# Patient Record
Sex: FEMALE | Race: WHITE | NOT HISPANIC OR LATINO | Employment: FULL TIME | URBAN - METROPOLITAN AREA
[De-identification: names, ages, dates, MRNs, and addresses within clinical notes are randomized per-mention and may not be internally consistent; named-entity substitution may affect disease eponyms.]

---

## 2019-08-08 ENCOUNTER — HOSPITAL ENCOUNTER (OUTPATIENT)
Dept: ULTRASOUND IMAGING | Facility: HOSPITAL | Age: 24
Discharge: HOME | End: 2019-08-08
Attending: NURSE PRACTITIONER

## 2019-09-13 ENCOUNTER — HOSPITAL ENCOUNTER (OUTPATIENT)
Dept: NUCLEAR MEDICINE | Facility: HOSPITAL | Age: 24
Discharge: HOME | End: 2019-09-13
Attending: NURSE PRACTITIONER

## 2019-11-26 ENCOUNTER — HOSPITAL ENCOUNTER (OUTPATIENT)
Dept: GENERAL RADIOLOGY | Facility: HOSPITAL | Age: 24
Discharge: HOME | End: 2019-11-26
Attending: NURSE PRACTITIONER

## 2020-03-23 ENCOUNTER — OFFICE VISIT CONVERTED (OUTPATIENT)
Dept: GASTROENTEROLOGY | Facility: CLINIC | Age: 25
End: 2020-03-23
Attending: INTERNAL MEDICINE

## 2020-03-23 ENCOUNTER — HOSPITAL ENCOUNTER (OUTPATIENT)
Dept: LAB | Facility: HOSPITAL | Age: 25
Discharge: HOME | End: 2020-03-23
Attending: INTERNAL MEDICINE

## 2020-03-23 LAB
ALBUMIN SERPL-MCNC: 5 G/DL (ref 3.5–5)
ALBUMIN/GLOB SERPL: 2.3 {RATIO} (ref 1.4–2.6)
ALP SERPL-CCNC: 63 U/L (ref 42–98)
ALT SERPL-CCNC: 20 U/L (ref 10–40)
ANION GAP SERPL CALC-SCNC: 18 MMOL/L (ref 8–19)
AST SERPL-CCNC: 20 U/L (ref 15–50)
BASOPHILS # BLD AUTO: 0.05 10*3/UL (ref 0–0.2)
BASOPHILS NFR BLD AUTO: 0.9 % (ref 0–3)
BILIRUB SERPL-MCNC: 1.45 MG/DL (ref 0.2–1.3)
BUN SERPL-MCNC: 8 MG/DL (ref 5–25)
BUN/CREAT SERPL: 12 {RATIO} (ref 6–20)
CALCIUM SERPL-MCNC: 9.6 MG/DL (ref 8.7–10.4)
CHLORIDE SERPL-SCNC: 101 MMOL/L (ref 99–111)
CONV ABS IMM GRAN: 0.01 10*3/UL (ref 0–0.2)
CONV CO2: 24 MMOL/L (ref 22–32)
CONV IMMATURE GRAN: 0.2 % (ref 0–1.8)
CONV TOTAL PROTEIN: 7.2 G/DL (ref 6.3–8.2)
CREAT UR-MCNC: 0.69 MG/DL (ref 0.5–0.9)
DEPRECATED RDW RBC AUTO: 39.3 FL (ref 36.4–46.3)
EOSINOPHIL # BLD AUTO: 0.05 10*3/UL (ref 0–0.7)
EOSINOPHIL # BLD AUTO: 0.9 % (ref 0–7)
ERYTHROCYTE [DISTWIDTH] IN BLOOD BY AUTOMATED COUNT: 12 % (ref 11.7–14.4)
GFR SERPLBLD BASED ON 1.73 SQ M-ARVRAT: >60 ML/MIN/{1.73_M2}
GLOBULIN UR ELPH-MCNC: 2.2 G/DL (ref 2–3.5)
GLUCOSE SERPL-MCNC: 82 MG/DL (ref 65–99)
HCT VFR BLD AUTO: 45.3 % (ref 37–47)
HGB BLD-MCNC: 14.4 G/DL (ref 12–16)
LYMPHOCYTES # BLD AUTO: 1.86 10*3/UL (ref 1–5)
LYMPHOCYTES NFR BLD AUTO: 32 % (ref 20–45)
MCH RBC QN AUTO: 28.6 PG (ref 27–31)
MCHC RBC AUTO-ENTMCNC: 31.8 G/DL (ref 33–37)
MCV RBC AUTO: 89.9 FL (ref 81–99)
MONOCYTES # BLD AUTO: 0.34 10*3/UL (ref 0.2–1.2)
MONOCYTES NFR BLD AUTO: 5.9 % (ref 3–10)
NEUTROPHILS # BLD AUTO: 3.5 10*3/UL (ref 2–8)
NEUTROPHILS NFR BLD AUTO: 60.1 % (ref 30–85)
NRBC CBCN: 0 % (ref 0–0.7)
OSMOLALITY SERPL CALC.SUM OF ELEC: 285 MOSM/KG (ref 273–304)
PLATELET # BLD AUTO: 226 10*3/UL (ref 130–400)
PMV BLD AUTO: 11.3 FL (ref 9.4–12.3)
POTASSIUM SERPL-SCNC: 3.8 MMOL/L (ref 3.5–5.3)
RBC # BLD AUTO: 5.04 10*6/UL (ref 4.2–5.4)
SODIUM SERPL-SCNC: 139 MMOL/L (ref 135–147)
T4 FREE SERPL-MCNC: 1.3 NG/DL (ref 0.9–1.8)
TSH SERPL-ACNC: 2.3 M[IU]/L (ref 0.27–4.2)
WBC # BLD AUTO: 5.81 10*3/UL (ref 4.8–10.8)

## 2020-03-24 LAB
ALBUMIN SERPL-MCNC: 4.1 G/DL (ref 2.9–4.4)
ALBUMIN/GLOB SERPL: 1.7 {RATIO} (ref 0.7–1.7)
ALPHA2 GLOB SERPL ELPH-MCNC: 0.7 G/DL (ref 0.4–1)
BETA GLOBULIN: 0.8 G/DL (ref 0.7–1.3)
CONV ALPHA-1-GLOBULIN: 0.2 G/DL (ref 0–0.4)
CONV IMMUNOGLOBULIN G (IGG): 671 MG/DL (ref 700–1600)
CONV IMMUNOGLOBULIN M (IGM): 66 MG/DL (ref 26–217)
CONV PE INTERPRETATION: ABNORMAL
CONV PE NOTE: ABNORMAL
CONV TOTAL PROTEIN: 6.5 G/DL (ref 6–8.5)
DEPRECATED MITOCHONDRIA M2 IGG SER-ACNC: <20 UNITS (ref 0–20)
DSDNA AB SER-ACNC: NEGATIVE [IU]/ML
ENA AB SER IA-ACNC: NEGATIVE {RATIO}
GAMMA GLOB SERPL ELPH-MCNC: 0.7 G/DL (ref 0.4–1.8)
GLOBULIN UR ELPH-MCNC: 2.4 G/DL (ref 2.2–3.9)
IGA SERPL-MCNC: 51 MG/DL (ref 87–352)
M-SPIKE: ABNORMAL G/DL
PROT PATTERN SERPL IFE-IMP: ABNORMAL
SMOOTH MUSCLE F-ACTIN AB IGG: 7 UNITS (ref 0–19)

## 2020-03-26 LAB — CONV CELIAC DISEASE AB-IGA: 42 MG/DL (ref 68–408)

## 2020-03-30 LAB — CELIAC DISEASE DUAL AG SCR: 6 UNITS (ref 0–19)

## 2020-06-19 ENCOUNTER — HOSPITAL ENCOUNTER (OUTPATIENT)
Dept: OTHER | Facility: HOSPITAL | Age: 25
Discharge: HOME | End: 2020-06-19
Attending: OBSTETRICS & GYNECOLOGY

## 2020-06-19 LAB
C TRACH RRNA CVX QL NAA+PROBE: NOT DETECTED
N GONORRHOEA DNA SPEC QL NAA+PROBE: NOT DETECTED

## 2020-06-21 LAB — BACTERIA UR CULT: NORMAL

## 2020-06-23 ENCOUNTER — HOSPITAL ENCOUNTER (OUTPATIENT)
Dept: OTHER | Facility: HOSPITAL | Age: 25
Discharge: HOME | End: 2020-06-23
Attending: OBSTETRICS & GYNECOLOGY

## 2020-06-23 LAB
ABO GROUP BLD: NORMAL
BLD GP AB SCN SERPL QL: NORMAL
CONV ABD CONTROL: NORMAL
RH BLD: NORMAL

## 2020-06-24 LAB
ABO GROUP BLD: ABNORMAL
BASOPHILS # BLD AUTO: 0.04 10*3/UL (ref 0–0.2)
BASOPHILS NFR BLD AUTO: 0.5 % (ref 0–3)
BLD GP AB SCN SERPL QL: ABNORMAL
CONV ABS IMM GRAN: 0.03 10*3/UL (ref 0–0.2)
CONV HEPATITIS B SURFACE AG W CONFIRMATION RE: NEGATIVE
CONV HGB ELECTROPHORESIS INTERPRETATION: NORMAL
CONV HIV-1/ HIV-2: NONREACTIVE
CONV IMMATURE GRAN: 0.4 % (ref 0–1.8)
DEPRECATED RDW RBC AUTO: 39.5 FL (ref 36.4–46.3)
EOSINOPHIL # BLD AUTO: 0.06 10*3/UL (ref 0–0.7)
EOSINOPHIL # BLD AUTO: 0.7 % (ref 0–7)
ERYTHROCYTE [DISTWIDTH] IN BLOOD BY AUTOMATED COUNT: 12.1 % (ref 11.7–14.4)
HCT VFR BLD AUTO: 43.5 % (ref 37–47)
HCV AB SER DONR QL: <0.1 S/CO RATIO (ref 0–0.9)
HEMOGLOBIN A: 97.8 % (ref 96.4–98.8)
HGB A2 MFR BLD COLUMN CHROM: 2.2 % (ref 1.8–3.2)
HGB BLD-MCNC: 14.3 G/DL (ref 12–16)
HGB C MFR BLD: 0 %
HGB F MFR BLD HPLC: 0 % (ref 0–2)
HGB S BLD QL SOLY: NEGATIVE
HGB S MFR BLD: 0 %
LYMPHOCYTES # BLD AUTO: 2.16 10*3/UL (ref 1–5)
LYMPHOCYTES NFR BLD AUTO: 26.2 % (ref 20–45)
MCH RBC QN AUTO: 29.1 PG (ref 27–31)
MCHC RBC AUTO-ENTMCNC: 32.9 G/DL (ref 33–37)
MCV RBC AUTO: 88.6 FL (ref 81–99)
MONOCYTES # BLD AUTO: 0.44 10*3/UL (ref 0.2–1.2)
MONOCYTES NFR BLD AUTO: 5.3 % (ref 3–10)
NEUTROPHILS # BLD AUTO: 5.52 10*3/UL (ref 2–8)
NEUTROPHILS NFR BLD AUTO: 66.9 % (ref 30–85)
NRBC CBCN: 0 % (ref 0–0.7)
PLATELET # BLD AUTO: 224 10*3/UL (ref 130–400)
PMV BLD AUTO: 10.6 FL (ref 9.4–12.3)
RBC # BLD AUTO: 4.91 10*6/UL (ref 4.2–5.4)
RH BLD: ABNORMAL
RPR SER QL: ABNORMAL
RUBV IGG SER-ACNC: 324 [IU]/ML
WBC # BLD AUTO: 8.25 10*3/UL (ref 4.8–10.8)

## 2020-06-25 LAB
CONV LAST MENSTURAL PERIOD: NORMAL
SPECIMEN SOURCE: NORMAL
SPECIMEN SOURCE: NORMAL
THIN PREP CVX: NORMAL

## 2020-07-13 ENCOUNTER — HOSPITAL ENCOUNTER (OUTPATIENT)
Dept: GENERAL RADIOLOGY | Facility: HOSPITAL | Age: 25
Discharge: HOME | End: 2020-07-13
Attending: OBSTETRICS & GYNECOLOGY

## 2020-09-08 ENCOUNTER — HOSPITAL ENCOUNTER (OUTPATIENT)
Dept: GENERAL RADIOLOGY | Facility: HOSPITAL | Age: 25
Discharge: HOME | End: 2020-09-08
Attending: OBSTETRICS & GYNECOLOGY

## 2020-10-30 ENCOUNTER — HOSPITAL ENCOUNTER (OUTPATIENT)
Dept: OTHER | Facility: HOSPITAL | Age: 25
Discharge: HOME | End: 2020-10-30
Attending: OBSTETRICS & GYNECOLOGY

## 2020-10-30 LAB
BASOPHILS # BLD AUTO: 0.03 10*3/UL (ref 0–0.2)
BASOPHILS NFR BLD AUTO: 0.3 % (ref 0–3)
CONV ABS IMM GRAN: 0.04 10*3/UL (ref 0–0.2)
CONV GLUCOSE LOAD: 50 G
CONV IMMATURE GRAN: 0.4 % (ref 0–1.8)
DEPRECATED RDW RBC AUTO: 42.5 FL (ref 36.4–46.3)
EOSINOPHIL # BLD AUTO: 0.12 10*3/UL (ref 0–0.7)
EOSINOPHIL # BLD AUTO: 1.3 % (ref 0–7)
ERYTHROCYTE [DISTWIDTH] IN BLOOD BY AUTOMATED COUNT: 12.7 % (ref 11.7–14.4)
GLUCOSE 1H P MEAL SERPL-MCNC: 182 MG/DL (ref 0–182)
GLUCOSE BLD-MCNC: 78 MG/DL (ref 65–99)
HCT VFR BLD AUTO: 40.1 % (ref 37–47)
HGB BLD-MCNC: 13.4 G/DL (ref 12–16)
LYMPHOCYTES # BLD AUTO: 1.97 10*3/UL (ref 1–5)
LYMPHOCYTES NFR BLD AUTO: 21.1 % (ref 20–45)
MCH RBC QN AUTO: 30.6 PG (ref 27–31)
MCHC RBC AUTO-ENTMCNC: 33.4 G/DL (ref 33–37)
MCV RBC AUTO: 91.6 FL (ref 81–99)
MONOCYTES # BLD AUTO: 0.45 10*3/UL (ref 0.2–1.2)
MONOCYTES NFR BLD AUTO: 4.8 % (ref 3–10)
NEUTROPHILS # BLD AUTO: 6.72 10*3/UL (ref 2–8)
NEUTROPHILS NFR BLD AUTO: 72.1 % (ref 30–85)
NRBC CBCN: 0 % (ref 0–0.7)
PLATELET # BLD AUTO: 189 10*3/UL (ref 130–400)
PMV BLD AUTO: 10.7 FL (ref 9.4–12.3)
RBC # BLD AUTO: 4.38 10*6/UL (ref 4.2–5.4)
WBC # BLD AUTO: 9.33 10*3/UL (ref 4.8–10.8)

## 2020-11-10 ENCOUNTER — HOSPITAL ENCOUNTER (OUTPATIENT)
Dept: OTHER | Facility: HOSPITAL | Age: 25
Discharge: HOME | End: 2020-11-10
Attending: OBSTETRICS & GYNECOLOGY

## 2020-11-10 LAB
CONV GLUCOSE LOAD: 100 G
GLUCOSE 1H P LAC PO SERPL-MCNC: 184 MG/DL
GLUCOSE 2H P 75 G GLC PO SERPL-MCNC: 154 MG/DL (ref 70–140)
GLUCOSE 3H P GLC SERPL-MCNC: 132 MG/DL
GLUCOSE BLD-MCNC: 68 MG/DL (ref 65–99)
GLUCOSE P FAST SERPL-MCNC: 73 MG/DL (ref 65–115)

## 2021-01-14 ENCOUNTER — HOSPITAL ENCOUNTER (OUTPATIENT)
Dept: OTHER | Facility: HOSPITAL | Age: 26
Discharge: HOME | End: 2021-01-14
Attending: OBSTETRICS & GYNECOLOGY

## 2021-01-16 LAB — STREP GP B CULTURE: NORMAL

## 2021-01-25 ENCOUNTER — HOSPITAL ENCOUNTER (OUTPATIENT)
Dept: PREADMISSION TESTING | Facility: HOSPITAL | Age: 26
Discharge: HOME | End: 2021-01-25
Attending: OBSTETRICS & GYNECOLOGY

## 2021-01-26 LAB — SARS-COV-2 RNA SPEC QL NAA+PROBE: NOT DETECTED

## 2021-05-15 VITALS — BODY MASS INDEX: 24.7 KG/M2 | HEIGHT: 62 IN | TEMPERATURE: 98.1 F | WEIGHT: 134.25 LBS | HEART RATE: 63 BPM

## 2023-02-02 ENCOUNTER — E-VISIT (OUTPATIENT)
Dept: FAMILY MEDICINE CLINIC | Facility: TELEHEALTH | Age: 28
End: 2023-02-02

## 2023-03-14 ENCOUNTER — TELEPHONE (OUTPATIENT)
Dept: OBSTETRICS AND GYNECOLOGY | Facility: CLINIC | Age: 28
End: 2023-03-14
Payer: COMMERCIAL

## 2023-03-14 DIAGNOSIS — R11.2 NAUSEA AND VOMITING, UNSPECIFIED VOMITING TYPE: Primary | ICD-10-CM

## 2023-03-14 RX ORDER — ONDANSETRON 4 MG/1
4 TABLET, FILM COATED ORAL EVERY 6 HOURS PRN
Qty: 30 TABLET | Refills: 2 | Status: SHIPPED | OUTPATIENT
Start: 2023-03-14

## 2023-03-14 NOTE — TELEPHONE ENCOUNTER
Patient called requesting something for nausea.  Patient states Pyridoxine & Doxylamine not helping.  Per protocol okay'd refill Zofran.  Next appointment 4/17/23

## 2023-04-17 ENCOUNTER — INITIAL PRENATAL (OUTPATIENT)
Dept: OBSTETRICS AND GYNECOLOGY | Facility: CLINIC | Age: 28
End: 2023-04-17
Payer: COMMERCIAL

## 2023-04-17 VITALS — WEIGHT: 158 LBS | DIASTOLIC BLOOD PRESSURE: 80 MMHG | BODY MASS INDEX: 28.9 KG/M2 | SYSTOLIC BLOOD PRESSURE: 124 MMHG

## 2023-04-17 DIAGNOSIS — Z34.80 SUPERVISION OF OTHER NORMAL PREGNANCY, ANTEPARTUM: Primary | ICD-10-CM

## 2023-04-17 LAB
ABO GROUP BLD: NORMAL
B-HCG UR QL: POSITIVE
BASOPHILS # BLD AUTO: 0.03 10*3/MM3 (ref 0–0.2)
BASOPHILS NFR BLD AUTO: 0.4 % (ref 0–1.5)
BLD GP AB SCN SERPL QL: NEGATIVE
DEPRECATED RDW RBC AUTO: 42.5 FL (ref 37–54)
EOSINOPHIL # BLD AUTO: 0.03 10*3/MM3 (ref 0–0.4)
EOSINOPHIL NFR BLD AUTO: 0.4 % (ref 0.3–6.2)
ERYTHROCYTE [DISTWIDTH] IN BLOOD BY AUTOMATED COUNT: 13.2 % (ref 12.3–15.4)
EXPIRATION DATE: ABNORMAL
GLUCOSE UR STRIP-MCNC: NEGATIVE MG/DL
HBV SURFACE AG SERPL QL IA: NORMAL
HCT VFR BLD AUTO: 43.5 % (ref 34–46.6)
HCV AB SER DONR QL: NORMAL
HGB BLD-MCNC: 14.4 G/DL (ref 12–15.9)
HIV1+2 AB SER QL: NORMAL
IMM GRANULOCYTES # BLD AUTO: 0.02 10*3/MM3 (ref 0–0.05)
IMM GRANULOCYTES NFR BLD AUTO: 0.3 % (ref 0–0.5)
INTERNAL NEGATIVE CONTROL: ABNORMAL
INTERNAL POSITIVE CONTROL: ABNORMAL
LYMPHOCYTES # BLD AUTO: 1.51 10*3/MM3 (ref 0.7–3.1)
LYMPHOCYTES NFR BLD AUTO: 19.1 % (ref 19.6–45.3)
Lab: ABNORMAL
MCH RBC QN AUTO: 29.1 PG (ref 26.6–33)
MCHC RBC AUTO-ENTMCNC: 33.1 G/DL (ref 31.5–35.7)
MCV RBC AUTO: 87.9 FL (ref 79–97)
MONOCYTES # BLD AUTO: 0.35 10*3/MM3 (ref 0.1–0.9)
MONOCYTES NFR BLD AUTO: 4.4 % (ref 5–12)
NEUTROPHILS NFR BLD AUTO: 5.98 10*3/MM3 (ref 1.7–7)
NEUTROPHILS NFR BLD AUTO: 75.4 % (ref 42.7–76)
NRBC BLD AUTO-RTO: 0 /100 WBC (ref 0–0.2)
PLATELET # BLD AUTO: 196 10*3/MM3 (ref 140–450)
PMV BLD AUTO: 10.8 FL (ref 6–12)
PROT UR STRIP-MCNC: NEGATIVE MG/DL
RBC # BLD AUTO: 4.95 10*6/MM3 (ref 3.77–5.28)
RH BLD: POSITIVE
T PALLIDUM IGG SER QL: NORMAL
WBC NRBC COR # BLD: 7.92 10*3/MM3 (ref 3.4–10.8)

## 2023-04-17 PROCEDURE — 86900 BLOOD TYPING SEROLOGIC ABO: CPT | Performed by: OBSTETRICS & GYNECOLOGY

## 2023-04-17 PROCEDURE — 86762 RUBELLA ANTIBODY: CPT | Performed by: OBSTETRICS & GYNECOLOGY

## 2023-04-17 PROCEDURE — 87661 TRICHOMONAS VAGINALIS AMPLIF: CPT | Performed by: OBSTETRICS & GYNECOLOGY

## 2023-04-17 PROCEDURE — 85025 COMPLETE CBC W/AUTO DIFF WBC: CPT | Performed by: OBSTETRICS & GYNECOLOGY

## 2023-04-17 PROCEDURE — 86803 HEPATITIS C AB TEST: CPT | Performed by: OBSTETRICS & GYNECOLOGY

## 2023-04-17 PROCEDURE — G0432 EIA HIV-1/HIV-2 SCREEN: HCPCS | Performed by: OBSTETRICS & GYNECOLOGY

## 2023-04-17 PROCEDURE — 0501F PRENATAL FLOW SHEET: CPT | Performed by: OBSTETRICS & GYNECOLOGY

## 2023-04-17 PROCEDURE — 81025 URINE PREGNANCY TEST: CPT | Performed by: OBSTETRICS & GYNECOLOGY

## 2023-04-17 PROCEDURE — 87491 CHLMYD TRACH DNA AMP PROBE: CPT | Performed by: OBSTETRICS & GYNECOLOGY

## 2023-04-17 PROCEDURE — 86850 RBC ANTIBODY SCREEN: CPT | Performed by: OBSTETRICS & GYNECOLOGY

## 2023-04-17 PROCEDURE — 81002 URINALYSIS NONAUTO W/O SCOPE: CPT | Performed by: OBSTETRICS & GYNECOLOGY

## 2023-04-17 PROCEDURE — 87086 URINE CULTURE/COLONY COUNT: CPT | Performed by: OBSTETRICS & GYNECOLOGY

## 2023-04-17 PROCEDURE — G0123 SCREEN CERV/VAG THIN LAYER: HCPCS | Performed by: OBSTETRICS & GYNECOLOGY

## 2023-04-17 PROCEDURE — 87591 N.GONORRHOEAE DNA AMP PROB: CPT | Performed by: OBSTETRICS & GYNECOLOGY

## 2023-04-17 PROCEDURE — 86780 TREPONEMA PALLIDUM: CPT | Performed by: OBSTETRICS & GYNECOLOGY

## 2023-04-17 PROCEDURE — 87340 HEPATITIS B SURFACE AG IA: CPT | Performed by: OBSTETRICS & GYNECOLOGY

## 2023-04-17 PROCEDURE — 86901 BLOOD TYPING SEROLOGIC RH(D): CPT | Performed by: OBSTETRICS & GYNECOLOGY

## 2023-04-17 NOTE — PROGRESS NOTES
OB Initial Visit    CC- Here for care of current pregnancy     Subjective:  28 y.o.  presenting for her first obstetrical visit.    LMP: Patient's last menstrual period was 2023 (exact date).   Patient has no complaints.  She denies any vaginal bleeding or pelvic pain.  She reports that her nausea is better.    Reviewed and updated:  OBHx, GYNHx (STDs), PMHx, Medications, Allergies, PSHx, Social Hx, Preventative Hx (PAP), Hx of abuse/safe environment, Vaccine Hx including hx of chickenpox or vaccine, Genetic Hx (pt, FOB, both families).        Objective:  /80   Wt 71.7 kg (158 lb)   LMP 2023 (Exact Date)   BMI 28.90 kg/m²   General- NAD, alert and oriented, appropriate  Psych- Normal mood, good memory  Neck- No masses, no thyroid enlargement  CV- Regular rhythm, no murnurs  Resp- CTA to bases, no wheezes  Abdomen- Soft, non distended, non tender, no masses   External genitalia- Normal, no lesions  Urethra- Normal, no masses, non tender  Vagina- Normal, no discharge  Bladder- Normal, no masses, non tender  Cvx- Normal, no lesions, no discharge, no CMT  Uterus- Normal shape and consistency, non tender, Consistent with dates  Adnexa- Normal, no mass, non tender  Lymphatic- No palpable neck, axillary, or groin nodes  Ext- No edema, no cyanosis    Skin- No lesions, no rashes, no acanthosis nigricans      Assessment and Plan:  Diagnoses and all orders for this visit:    1. Supervision of other normal pregnancy, antepartum (Primary)  Overview:  EDC:    Prenatal genetic screening: Declined    Previous  delivery    COVID-19 vaccine: Recommended  Flu vaccine: Recommended  Tdap vaccine: Recommended    Assessment & Plan:  Continue prenatal vitamins  Check prenatal labs  Check dating ultrasound  Discussed office visit schedule and call rotation  Reviewed medication safe in pregnancy  Declines prenatal genetic screening  Reviewed nutrition, exercise, and appropriate weight gain in  pregnancy    Orders:  -     POC Urinalysis Dipstick  -     POC Pregnancy, Urine  -     Urine Culture - Urine, Urine, Clean Catch  -     IGP,CtNgTv,rfx Aptima HPV ASCU  -     OB Panel With HIV  -     US Ob 14 + Weeks Single or First Gestation; Future          14w4d    Genetic Screening: Counseled.  Declines all.     Vaccines: Recommend FLU vaccine this season, R/B discussed  Recommend COVID vaccine, R/B discussed    Counseling: Nutrition discussed, calories, activity/exercise in pregnancy  Discussed dietary restrictions/safety food preparation in pregnancy  Reviewed what to expect prenatal visits, office providers and [facility] hospitalists  Appropriate trimester precautions provided, N/V, vag bleeding, cramping  Questions answered    Return in about 5 weeks (around 5/22/2023) for Recheck.      Tyson Vidal MD  04/17/2023

## 2023-04-18 LAB — BACTERIA SPEC AEROBE CULT: NO GROWTH

## 2023-04-19 LAB — RUBV IGG SERPL IA-ACNC: 5.54 INDEX

## 2023-04-20 PROBLEM — O34.219 PREVIOUS CESAREAN DELIVERY AFFECTING PREGNANCY: Status: ACTIVE | Noted: 2023-04-20

## 2023-04-20 NOTE — ASSESSMENT & PLAN NOTE
Continue prenatal vitamins  Check prenatal labs  Check dating ultrasound  Discussed office visit schedule and call rotation  Reviewed medication safe in pregnancy  Declines prenatal genetic screening  Reviewed nutrition, exercise, and appropriate weight gain in pregnancy

## 2023-04-24 LAB
C TRACH RRNA CVX QL NAA+PROBE: NEGATIVE
CONV .: NORMAL
CYTOLOGIST CVX/VAG CYTO: NORMAL
CYTOLOGY CVX/VAG DOC CYTO: NORMAL
CYTOLOGY CVX/VAG DOC THIN PREP: NORMAL
DX ICD CODE: NORMAL
HIV 1 & 2 AB SER-IMP: NORMAL
N GONORRHOEA RRNA CVX QL NAA+PROBE: NEGATIVE
OTHER STN SPEC: NORMAL
STAT OF ADQ CVX/VAG CYTO-IMP: NORMAL
T VAGINALIS RRNA SPEC QL NAA+PROBE: NEGATIVE

## 2023-05-23 ENCOUNTER — ROUTINE PRENATAL (OUTPATIENT)
Dept: OBSTETRICS AND GYNECOLOGY | Facility: CLINIC | Age: 28
End: 2023-05-23
Payer: COMMERCIAL

## 2023-05-23 VITALS — BODY MASS INDEX: 29.45 KG/M2 | SYSTOLIC BLOOD PRESSURE: 117 MMHG | DIASTOLIC BLOOD PRESSURE: 77 MMHG | WEIGHT: 161 LBS

## 2023-05-23 DIAGNOSIS — O34.219 PREVIOUS CESAREAN DELIVERY AFFECTING PREGNANCY: ICD-10-CM

## 2023-05-23 DIAGNOSIS — Z34.80 SUPERVISION OF OTHER NORMAL PREGNANCY, ANTEPARTUM: Primary | ICD-10-CM

## 2023-05-23 LAB
GLUCOSE UR STRIP-MCNC: NEGATIVE MG/DL
PROT UR STRIP-MCNC: NEGATIVE MG/DL

## 2023-05-23 NOTE — ASSESSMENT & PLAN NOTE
Reviewed today's anatomy ultrasound.  Anatomy appears normal.  Anterior placenta.  Continue prenatal vitamin  1 hour GTT next office visit

## 2023-05-23 NOTE — PROGRESS NOTES
OB FOLLOW UP    CC: Scheduled OB routine FU     Prenatal care complicated by:   Patient Active Problem List   Diagnosis   • Supervision of other normal pregnancy, antepartum   • Previous  delivery affecting pregnancy       Subjective:   Patient has: No complaints, No leaking fluid, No vaginal bleeding, No contractions, Adequate FM    Objective:  Urine glucose/protein- see flow sheet      /77   Wt 73 kg (161 lb)   LMP 2023 (Exact Date)   BMI 29.45 kg/m²   See OB flow for LE edema, and cvx exam if applicable  FHT: 164 BPM   Uterine Size: 20 cm      Assessment and Plan:  Diagnoses and all orders for this visit:    1. Supervision of other normal pregnancy, antepartum (Primary)  Overview:  EDC: 10/12/2023    Prenatal genetic screening: Declined    Previous  delivery    COVID-19 vaccine: Recommended  Flu vaccine: Recommended  Tdap vaccine: Recommended    Assessment & Plan:  Reviewed today's anatomy ultrasound.  Anatomy appears normal.  Anterior placenta.  Continue prenatal vitamin  1 hour GTT next office visit    Orders:  -     POC Urinalysis Dipstick    2. Previous  delivery affecting pregnancy  Overview:  For breech presentation  Thinking she desires repeat  delivery          19w5d  Reassuring pregnancy progress    Counseling: Second trimester precautions  OB precautions, leaking, VB, stephany capellan vs PTL/Labor    Questions answered    Return in about 5 weeks (around 2023) for Recheck.      Tyson Vidal MD  2023

## 2023-07-25 ENCOUNTER — ROUTINE PRENATAL (OUTPATIENT)
Dept: OBSTETRICS AND GYNECOLOGY | Facility: CLINIC | Age: 28
End: 2023-07-25
Payer: COMMERCIAL

## 2023-07-25 VITALS — BODY MASS INDEX: 31.64 KG/M2 | WEIGHT: 173 LBS | SYSTOLIC BLOOD PRESSURE: 121 MMHG | DIASTOLIC BLOOD PRESSURE: 80 MMHG

## 2023-07-25 DIAGNOSIS — O34.219 PREVIOUS CESAREAN DELIVERY AFFECTING PREGNANCY: ICD-10-CM

## 2023-07-25 DIAGNOSIS — Z34.80 SUPERVISION OF OTHER NORMAL PREGNANCY, ANTEPARTUM: Primary | ICD-10-CM

## 2023-07-25 LAB
GLUCOSE UR STRIP-MCNC: NEGATIVE MG/DL
PROT UR STRIP-MCNC: NEGATIVE MG/DL

## 2023-07-25 PROCEDURE — 0502F SUBSEQUENT PRENATAL CARE: CPT | Performed by: OBSTETRICS & GYNECOLOGY

## 2023-07-25 NOTE — PROGRESS NOTES
OB FOLLOW UP    CC: Scheduled OB routine FU     Prenatal care complicated by:   Patient Active Problem List   Diagnosis    Supervision of other normal pregnancy, antepartum    Previous  delivery affecting pregnancy       Subjective:   Patient has: No leaking fluid, No vaginal bleeding, No contractions, Adequate FM  Reports some discomfort and burning sensation around the umbilicus just off to the right side.  Was seen on labor and delivery about 2 weeks ago for this.  Symptoms are intermittent.  Better if at rest and lying down.    Objective:  Urine glucose/protein- see flow sheet      /80   Wt 78.5 kg (173 lb)   LMP 2023 (Exact Date)   BMI 31.64 kg/m²   See OB flow for LE edema, and cvx exam if applicable  FHT: 148 BPM   Uterine Size:  29 cm  Abdomen: Soft, gravid, nontender, nondistended, no palpable masses.  Appropriate fundal height.  No significant tenderness noted in the periumbilical region.    Assessment and Plan:  Diagnoses and all orders for this visit:    1. Supervision of other normal pregnancy, antepartum (Primary)  Overview:  EDC: 10/12/2023    Prenatal genetic screening: Declined    Previous  delivery    COVID-19 vaccine: Recommended  Flu vaccine: Recommended  Tdap vaccine: Recommended    Orders:  -     POC Urinalysis Dipstick          28w5d  Reassuring pregnancy progress    Counseling: OB precautions, leaking, VB, stephany capellan vs PTL/Labor  [facility]    Questions answered    No follow-ups on file.      Tyson Vidal MD  2023

## 2023-07-25 NOTE — ASSESSMENT & PLAN NOTE
Continue prenatal vitamins  Fetal kick counts   labor warnings  1 hour GTT elevated but 3-hour GTT normal  I suspect the patient's symptoms are related to stretching of the falciform ligament due to the gravid uterus.  There are no significant abnormalities on today's exam findings.

## 2023-08-04 NOTE — PROGRESS NOTES
OB FOLLOW UP      CC- FU OB    Subjective:   No complaints    Objective:  /77   Wt 78 kg (172 lb)   LMP 2023 (Exact Date)   BMI 31.46 kg/mý  9.072 kg (20 lb)  See OB flow sheet for fundal height (not performed if US day of OV), FHT, edema, cvx exam if performed, and Upro/Uglu      Assessment and Plan:  30w4d   Reassuring pregnancy progress.  Questions answered.  Diagnoses and all orders for this visit:    1. Supervision of other normal pregnancy, antepartum (Primary)  Overview:  EDC: 10/12/2023 by LMP and anatomy US 19weeks    Prenatal genetic screening: Declined    Previous  delivery    COVID-19 vaccine: Recommended  Flu vaccine: Recommended  Tdap vaccine: Recommended, 2023 Rx    ? Desires sterlizlization: No    Orders:  -     POC Urinalysis Dipstick    2. Previous  delivery affecting pregnancy  Overview:  For breech presentation  Repeat  delivery 10/5/2023        Counseling:    OB precautions, leaking, VB, stephany capellan vs PTL/Labor  [facility]    Continue PNV.  Importance of healthy eating, obtaining sufficient sleep, and staying active unless hypertensive- activity modified as directed.    Return in about 2 weeks (around 2023) for FU OB q2wks to 36weeks.            Leila Enriquez,   2023    [facility]  [address] [place] [address]  Dept: [phone]  Dept Fax: [phone]  Loc: [phone]  Loc Fax: [phone]

## 2023-08-07 ENCOUNTER — ROUTINE PRENATAL (OUTPATIENT)
Dept: OBSTETRICS AND GYNECOLOGY | Facility: CLINIC | Age: 28
End: 2023-08-07
Payer: COMMERCIAL

## 2023-08-07 VITALS — WEIGHT: 172 LBS | BODY MASS INDEX: 31.46 KG/M2 | SYSTOLIC BLOOD PRESSURE: 133 MMHG | DIASTOLIC BLOOD PRESSURE: 77 MMHG

## 2023-08-07 DIAGNOSIS — O34.219 PREVIOUS CESAREAN DELIVERY AFFECTING PREGNANCY: ICD-10-CM

## 2023-08-07 DIAGNOSIS — Z34.80 SUPERVISION OF OTHER NORMAL PREGNANCY, ANTEPARTUM: Primary | ICD-10-CM

## 2023-08-07 LAB
GLUCOSE UR STRIP-MCNC: NEGATIVE MG/DL
PROT UR STRIP-MCNC: NEGATIVE MG/DL

## 2023-08-07 PROCEDURE — 0502F SUBSEQUENT PRENATAL CARE: CPT | Performed by: OBSTETRICS & GYNECOLOGY

## 2023-08-22 ENCOUNTER — ROUTINE PRENATAL (OUTPATIENT)
Dept: OBSTETRICS AND GYNECOLOGY | Facility: CLINIC | Age: 28
End: 2023-08-22
Payer: COMMERCIAL

## 2023-08-22 VITALS — SYSTOLIC BLOOD PRESSURE: 114 MMHG | WEIGHT: 179 LBS | BODY MASS INDEX: 32.74 KG/M2 | DIASTOLIC BLOOD PRESSURE: 77 MMHG

## 2023-08-22 DIAGNOSIS — O26.849 FETAL SIZE INCONSISTENT WITH DATES: ICD-10-CM

## 2023-08-22 DIAGNOSIS — O34.219 PREVIOUS CESAREAN DELIVERY AFFECTING PREGNANCY: ICD-10-CM

## 2023-08-22 DIAGNOSIS — Z34.80 SUPERVISION OF OTHER NORMAL PREGNANCY, ANTEPARTUM: Primary | ICD-10-CM

## 2023-08-22 LAB
GLUCOSE UR STRIP-MCNC: NEGATIVE MG/DL
PROT UR STRIP-MCNC: ABNORMAL MG/DL

## 2023-08-22 NOTE — ASSESSMENT & PLAN NOTE
Continue prenatal vitamins  Fetal kick counts   labor warnings  Encourage to increase p.o. hydration.  Avoid overheating.

## 2023-08-22 NOTE — PROGRESS NOTES
OB FOLLOW UP    CC: Scheduled OB routine FU     Prenatal care complicated by:   Patient Active Problem List   Diagnosis    Supervision of other normal pregnancy, antepartum    Previous  delivery affecting pregnancy    Fetal size inconsistent with dates       Subjective:   Patient has: No leaking fluid, No vaginal bleeding, Adequate FM  Reports some increased [place] contractions.  Also had some lightheadedness over the weekend but resolved with rest and fluids    Objective:  Urine glucose/protein- see flow sheet      /77   Wt 81.2 kg (179 lb)   LMP 2023 (Exact Date)   BMI 32.74 kg/mý   See OB flow for LE edema, and cvx exam if applicable  FHT: 148 BPM   Uterine Size:  34 cm      Assessment and Plan:  Diagnoses and all orders for this visit:    1. Supervision of other normal pregnancy, antepartum (Primary)  Overview:  EDC: 10/12/2023 by LMP and anatomy US 19weeks    Prenatal genetic screening: Declined    Previous  delivery    COVID-19 vaccine: Recommended  Flu vaccine: Recommended  Tdap vaccine: Recommended, 2023 Rx    Assessment & Plan:  Continue prenatal vitamins  Fetal kick counts   labor warnings  Encourage to increase p.o. hydration.  Avoid overheating.    Orders:  -     POC Urinalysis Dipstick    2. Previous  delivery affecting pregnancy  Overview:  For breech presentation  Repeat  delivery 10/5/2023      3. Fetal size inconsistent with dates  Overview:  Ultrasound ordered 2023    Assessment & Plan:  Check growth ultrasound    Orders:  -     Cancel: US Ob 14 + Weeks Single or First Gestation; Future  -     US Ob 14 + Weeks Single or First Gestation; Future          32w5d  Reassuring pregnancy progress    Counseling: OB precautions, leaking, VB, stephany rios vs PTL/Labor  [facility]    Questions answered    Return in about 2 weeks (around 2023) for Recheck.      Tyson Vidal MD  2023

## 2023-09-05 ENCOUNTER — ROUTINE PRENATAL (OUTPATIENT)
Dept: OBSTETRICS AND GYNECOLOGY | Facility: CLINIC | Age: 28
End: 2023-09-05
Payer: COMMERCIAL

## 2023-09-05 VITALS — DIASTOLIC BLOOD PRESSURE: 78 MMHG | BODY MASS INDEX: 33.65 KG/M2 | SYSTOLIC BLOOD PRESSURE: 122 MMHG | WEIGHT: 184 LBS

## 2023-09-05 DIAGNOSIS — Z34.80 SUPERVISION OF OTHER NORMAL PREGNANCY, ANTEPARTUM: Primary | ICD-10-CM

## 2023-09-05 DIAGNOSIS — O26.849 FETAL SIZE INCONSISTENT WITH DATES: ICD-10-CM

## 2023-09-05 DIAGNOSIS — O34.219 PREVIOUS CESAREAN DELIVERY AFFECTING PREGNANCY: ICD-10-CM

## 2023-09-05 LAB
GLUCOSE UR STRIP-MCNC: NEGATIVE MG/DL
PROT UR STRIP-MCNC: NEGATIVE MG/DL

## 2023-09-05 NOTE — PROGRESS NOTES
OB FOLLOW UP    CC: Scheduled OB routine FU     Prenatal care complicated by:   Patient Active Problem List   Diagnosis    Supervision of other normal pregnancy, antepartum    Previous  delivery affecting pregnancy    Fetal size inconsistent with dates       Subjective:   Patient has: No complaints, No leaking fluid, No vaginal bleeding, Adequate FM  Reports occasional contractions    Objective:  Urine glucose/protein- see flow sheet      /78   Wt 83.5 kg (184 lb)   LMP 2023 (Exact Date)   BMI 33.65 kg/m²   See OB flow for LE edema, and cvx exam if applicable  FHT: 158 BPM   Uterine Size:  36 cm      Assessment and Plan:  Diagnoses and all orders for this visit:    1. Supervision of other normal pregnancy, antepartum (Primary)  Overview:  EDC: 10/12/2023 by LMP and anatomy US 19weeks    Prenatal genetic screening: Declined    Previous  delivery    COVID-19 vaccine: Recommended  Flu vaccine: Recommended  Tdap vaccine: Recommended, 2023 Rx    Assessment & Plan:  Continue prenatal vitamins  Fetal kick counts   labor warnings  GBS next office visit    Orders:  -     POC Urinalysis Dipstick    2. Previous  delivery affecting pregnancy  Overview:  For breech presentation  Repeat  delivery 10/5/2023      3. Fetal size inconsistent with dates  Overview:  Ultrasound ordered 2023    Assessment & Plan:  Growth ultrasound pending            34w5d  Reassuring pregnancy progress    Counseling: OB precautions, leaking, VB, stephany capellan vs PTL/Labor  [facility]    Questions answered    Return in about 2 weeks (around 2023) for Recheck.      Tyson Vidal MD  2023

## 2023-09-19 NOTE — PROGRESS NOTES
OB FOLLOW UP    CC: Scheduled OB routine FU     Prenatal care complicated by:   Patient Active Problem List   Diagnosis    Supervision of other normal pregnancy, antepartum    Previous  delivery affecting pregnancy    Fetal size inconsistent with dates       Subjective:   Patient has: No complaints, No leaking fluid, No vaginal bleeding, Adequate FM  Reports occasional contractions    Objective:  Urine glucose/protein- see flow sheet      /81   Wt 84.8 kg (187 lb)   LMP 2023 (Exact Date)   BMI 34.20 kg/m²   See OB flow for LE edema, and cvx exam if applicable  FHT: 153 BPM   Uterine Size:  38 cm      Assessment and Plan:  Diagnoses and all orders for this visit:    1. Supervision of other normal pregnancy, antepartum  Overview:  EDC: 10/12/2023 by LMP and anatomy US 19weeks    Prenatal genetic screening: Declined    Previous  delivery    COVID-19 vaccine: Recommended  Flu vaccine: Recommended  Tdap vaccine: Recommended, 2023 Rx    Assessment & Plan:  GBS collected today  Continue prenatal vitamins  Fetal kick counts  Labor instructions    Orders:  -     POC Urinalysis Dipstick  -     Group B Strep (Molecular) - Swab, Vaginal/Rectum    2. Previous  delivery affecting pregnancy  Overview:  For breech presentation  Repeat  delivery 10/5/2023    Assessment & Plan:  We have reviewed the risks, benefits, and alternatives of the procedure including the risk of: bleeding, infection, hemorrhage, blood transfusion, risk of injury to nearby structures including: bowl, bladder, pelvic blood vessels and nerves, risk of injury to the baby, risk of anesthesia, venous thromboembolism, myocardial infarction, stroke, and death. We have also discussed the risks of repetitive  deliveries including the risk of abnormal placentation such as placenta accreta. The patient expresses her understanding of these risks and wishes to proceed.    Orders:  -     sodium chloride 0.9 % flush  10 mL  -     sodium chloride 0.9 % flush 10 mL  -     sodium chloride 0.9 % infusion 40 mL  -     lidocaine (XYLOCAINE) 1 % injection 0.5 mL  -     lactated ringers bolus 1,000 mL  -     lactated ringers infusion  -     Sod Citrate-Citric Acid (BICITRA) oral solution 30 mL  -     ceFAZolin (ANCEF) 2 g in sodium chloride 0.9 % 100 mL IVPB  -     oxytocin (PITOCIN) 30 units in 0.9% sodium chloride 500 mL (premix)  -     methylergonovine (METHERGINE) injection 200 mcg  -     carboprost (HEMABATE) injection 250 mcg  -     miSOPROStol (CYTOTEC) tablet 800 mcg  -     Tranexamic Acid 1,000 mg in sodium chloride 0.9 % 100 mL  -     ondansetron (ZOFRAN) tablet 4 mg  -     ondansetron (ZOFRAN) injection 4 mg  -     metoclopramide (REGLAN) 10 mg in sodium chloride 0.9 % 50 mL IVPB  -     famotidine (PEPCID) injection 20 mg  -     famotidine (PEPCID) tablet 20 mg  -     ketorolac (TORADOL) injection 30 mg  -     acetaminophen (TYLENOL) tablet 1,000 mg    3. Fetal size inconsistent with dates  Overview:  Ultrasound 9/21/2023: EFW 8 pounds 1 ounce, 95th percentile.  The AC is at the 99th percentile.  LISANDRO 18.2.  Cephalic presentation.  Anterior placenta.    Assessment & Plan:  Reviewed today's ultrasound findings.  Robust fetal growth at the 95th percentile.  LISANDRO is normal and cephalic presentation confirmed.      Other orders  -     Follow Anesthesia Guidelines / Protocol; Future  -     Admit To Obstetrics Inpatient; Standing  -     Code Status and Medical Interventions:; Standing  -     Obtain Informed Consent; Standing  -     Vital Signs q 4 while awake; Standing  -     Vital Signs Per Hospital Policy; Standing  -     Continuous Fetal Monitoring With NST on Admission and Prior to Initiation of Oxytocin.; Standing  -     External Uterine Contraction Monitoring; Standing  -     Notify Provider (Specified); Standing  -     Notify Provider of Tachysystole (Per Hospital Algorithm); Standing  -     Notify Provider if Membranes  Ruptured, Bleeding Greater Than 1 Pad Per Hour, Fetal Heart Tone Abnormality or Severe Pain; Standing  -     May Ambulate if Membranes Intact or Head Engaged With Ruptured BOW or Normal Tracing for 20 Minutes; Standing  -     Insert Indwelling Urinary Catheter; Standing  -     Assess Need for Indwelling Urinary Catheter - Follow Removal Protocol; Standing  -     Urinary Catheter Care; Standing  -     Abdominal Prep With Clippers; Standing  -     Chlorhexadine Skin Prep Unless Otherwise Indicated; Standing  -     SCD (Sequential Compression Devices); Standing  -     NPO Diet NPO Type: Ice Chips; Standing  -     Type & Screen; Standing  -     CBC & Differential; Standing  -     Insert Peripheral IV; Standing  -     Saline Lock & Maintain IV Access; Standing  -     Notify Provider (Specified); Standing  -     Vital Signs Per Hospital Policy; Standing  -     Strict Bed Rest; Standing  -     Fundal & Lochia Check; Standing  -     Fundal & Lochia Check; Standing  -     Indwelling Urinary Catheter; Standing  -     Diet: Regular/House Diet; Texture: Regular Texture (IDDSI 7); Fluid Consistency: Thin (IDDSI 0); Standing  -     Advance Diet As Tolerated -; Standing  -     Blood Gas, Arterial, Cord; Standing  -     Blood Gas, Venous, Cord; Standing  -     If indicated -- Please administer RH Immunoglobulin based on results of cord blood evaluation and fetal screen lab tests, pharmacy to dispense; Standing          36w5d  Reassuring pregnancy progress    Counseling: OB precautions, leaking, VB, stephany capellan vs PTL/Labor  [facility]    Questions answered    Return in about 1 week (around 9/27/2023) for Recheck.      Tyson Vidal MD  09/20/2023

## 2023-09-20 ENCOUNTER — ROUTINE PRENATAL (OUTPATIENT)
Dept: OBSTETRICS AND GYNECOLOGY | Facility: CLINIC | Age: 28
End: 2023-09-20
Payer: COMMERCIAL

## 2023-09-20 VITALS — DIASTOLIC BLOOD PRESSURE: 81 MMHG | SYSTOLIC BLOOD PRESSURE: 131 MMHG | WEIGHT: 187 LBS | BODY MASS INDEX: 34.2 KG/M2

## 2023-09-20 DIAGNOSIS — Z34.80 SUPERVISION OF OTHER NORMAL PREGNANCY, ANTEPARTUM: ICD-10-CM

## 2023-09-20 DIAGNOSIS — O26.849 FETAL SIZE INCONSISTENT WITH DATES: ICD-10-CM

## 2023-09-20 DIAGNOSIS — O34.219 PREVIOUS CESAREAN DELIVERY AFFECTING PREGNANCY: ICD-10-CM

## 2023-09-20 LAB
GLUCOSE UR STRIP-MCNC: NEGATIVE MG/DL
PROT UR STRIP-MCNC: NEGATIVE MG/DL

## 2023-09-20 PROCEDURE — 87653 STREP B DNA AMP PROBE: CPT | Performed by: OBSTETRICS & GYNECOLOGY

## 2023-09-21 LAB — GROUP B STREP, DNA: POSITIVE

## 2023-09-21 RX ORDER — SODIUM CHLORIDE 0.9 % (FLUSH) 0.9 %
10 SYRINGE (ML) INJECTION EVERY 12 HOURS SCHEDULED
OUTPATIENT
Start: 2023-09-21

## 2023-09-21 RX ORDER — CITRIC ACID/SODIUM CITRATE 334-500MG
30 SOLUTION, ORAL ORAL ONCE
OUTPATIENT
Start: 2023-09-21 | End: 2023-09-21

## 2023-09-21 RX ORDER — ACETAMINOPHEN 500 MG
1000 TABLET ORAL ONCE
OUTPATIENT
Start: 2023-09-21 | End: 2023-09-21

## 2023-09-21 RX ORDER — SODIUM CHLORIDE 0.9 % (FLUSH) 0.9 %
10 SYRINGE (ML) INJECTION AS NEEDED
OUTPATIENT
Start: 2023-09-21

## 2023-09-21 RX ORDER — MISOPROSTOL 100 UG/1
800 TABLET ORAL AS NEEDED
OUTPATIENT
Start: 2023-09-21

## 2023-09-21 RX ORDER — FAMOTIDINE 10 MG
20 TABLET ORAL ONCE AS NEEDED
OUTPATIENT
Start: 2023-09-21

## 2023-09-21 RX ORDER — METHYLERGONOVINE MALEATE 0.2 MG/ML
200 INJECTION INTRAVENOUS ONCE AS NEEDED
OUTPATIENT
Start: 2023-09-21

## 2023-09-21 RX ORDER — LIDOCAINE HYDROCHLORIDE 10 MG/ML
0.5 INJECTION, SOLUTION INFILTRATION; PERINEURAL ONCE AS NEEDED
OUTPATIENT
Start: 2023-09-21

## 2023-09-21 RX ORDER — CARBOPROST TROMETHAMINE 250 UG/ML
250 INJECTION, SOLUTION INTRAMUSCULAR AS NEEDED
OUTPATIENT
Start: 2023-09-21

## 2023-09-21 RX ORDER — OXYTOCIN/0.9 % SODIUM CHLORIDE 30/500 ML
125 PLASTIC BAG, INJECTION (ML) INTRAVENOUS ONCE
OUTPATIENT
Start: 2023-09-21 | End: 2023-09-21

## 2023-09-21 RX ORDER — SODIUM CHLORIDE 9 MG/ML
40 INJECTION, SOLUTION INTRAVENOUS AS NEEDED
OUTPATIENT
Start: 2023-09-21

## 2023-09-21 RX ORDER — KETOROLAC TROMETHAMINE 15 MG/ML
30 INJECTION, SOLUTION INTRAMUSCULAR; INTRAVENOUS ONCE
OUTPATIENT
Start: 2023-09-21 | End: 2023-09-21

## 2023-09-21 RX ORDER — SODIUM CHLORIDE, SODIUM LACTATE, POTASSIUM CHLORIDE, CALCIUM CHLORIDE 600; 310; 30; 20 MG/100ML; MG/100ML; MG/100ML; MG/100ML
150 INJECTION, SOLUTION INTRAVENOUS CONTINUOUS
OUTPATIENT
Start: 2023-09-21

## 2023-09-21 RX ORDER — ONDANSETRON 2 MG/ML
4 INJECTION INTRAMUSCULAR; INTRAVENOUS EVERY 6 HOURS PRN
OUTPATIENT
Start: 2023-09-21

## 2023-09-21 RX ORDER — ONDANSETRON 4 MG/1
4 TABLET, FILM COATED ORAL EVERY 6 HOURS PRN
OUTPATIENT
Start: 2023-09-21

## 2023-09-21 RX ORDER — FAMOTIDINE 10 MG/ML
20 INJECTION, SOLUTION INTRAVENOUS ONCE AS NEEDED
OUTPATIENT
Start: 2023-09-21

## 2023-09-21 NOTE — ASSESSMENT & PLAN NOTE
Reviewed today's ultrasound findings.  Robust fetal growth at the 95th percentile.  LISANDRO is normal and cephalic presentation confirmed.

## 2023-09-25 ENCOUNTER — HOSPITAL ENCOUNTER (OUTPATIENT)
Facility: HOSPITAL | Age: 28
Discharge: HOME | End: 2023-09-26
Attending: OBSTETRICS & GYNECOLOGY | Admitting: OBSTETRICS & GYNECOLOGY
Payer: COMMERCIAL

## 2023-09-25 VITALS
HEART RATE: 85 BPM | DIASTOLIC BLOOD PRESSURE: 73 MMHG | OXYGEN SATURATION: 98 % | SYSTOLIC BLOOD PRESSURE: 127 MMHG | RESPIRATION RATE: 18 BRPM | TEMPERATURE: 98.1 F

## 2023-09-25 LAB
BILIRUB BLD-MCNC: NEGATIVE MG/DL
CLARITY, POC: CLEAR
COLOR UR: YELLOW
GLUCOSE UR STRIP-MCNC: NEGATIVE MG/DL
KETONES UR QL: NEGATIVE
LEUKOCYTE EST, POC: NEGATIVE
NITRITE UR-MCNC: NEGATIVE MG/ML
PH UR: 6.5 [PH] (ref 5–8)
PROT UR STRIP-MCNC: NEGATIVE MG/DL
RBC # UR STRIP: NEGATIVE /UL
SP GR UR: 1.02 (ref 1–1.03)
UROBILINOGEN UR QL: NORMAL

## 2023-09-25 PROCEDURE — 81002 URINALYSIS NONAUTO W/O SCOPE: CPT | Performed by: OBSTETRICS & GYNECOLOGY

## 2023-09-26 PROCEDURE — G0463 HOSPITAL OUTPT CLINIC VISIT: HCPCS

## 2023-09-26 PROCEDURE — 59025 FETAL NON-STRESS TEST: CPT

## 2023-09-26 NOTE — PROGRESS NOTES
Patient resting. Still with contractions but most seem more mild per patient. Good fetal movement.    FHR reactive with category 1 strip  [place] - contractions every 3-6 minutes    Cervix - closed/50%/-3 (no change)    Will discharge patient to home. Recommend rest/hydrate. Return to L&D for worsening contractions, decreased fetal movement, vaginal bleeding, leaking fluid. Patient and  agree.     Neema Ruiz MD  9/26/2023  00:51 EDT

## 2023-09-26 NOTE — SIGNIFICANT NOTE
Discharge instructions given and explained, verbalizes understanding. Left floor ambulatory, no distress noted. Accompanied by spouse

## 2023-09-26 NOTE — NON STRESS TEST
Obstetrical Non-stress Test Interpretation     Name:  Mercedes Hanson  MRN: [id number]    28 y.o. female  at 37w5d    Indication: contractions      Fetal Assessment  Fetal Movement: active  Fetal HR Assessment Method: external  Fetal HR (beats/min): 125  Fetal HR Baseline: normal range  Fetal HR Variability: moderate (amplitude range 6 to 25 bpm)  Fetal HR Accelerations: episodic, greater than/equal to 15 bpm, lasting at least 15 seconds  Fetal HR Decelerations: absent  Sinusoidal Pattern Present: absent    /73   Pulse 85   Temp 98.1 °F (36.7 °C) (Oral)   Resp 18   LMP 2023 (Exact Date)   SpO2 98%     Reason for test:    Date of Test: 2023  Time frame of test: 5206-1622  RN NST Interpretation: Reactive      Nerissa Antonio RN  2023  00:51 EDT

## 2023-09-26 NOTE — PROGRESS NOTES
OB FOLLOW UP    CC: Scheduled OB routine FU     Prenatal care complicated by:   Patient Active Problem List   Diagnosis    Supervision of other normal pregnancy, antepartum    Previous  delivery affecting pregnancy    Fetal size inconsistent with dates       Subjective:   Patient has: No complaints, No leaking fluid, No vaginal bleeding, Adequate FM  Had increased contractions on Monday but these have gone away.  Was seen in triage and was not dilated.    Objective:  Urine glucose/protein- see flow sheet      /68   Wt 84.8 kg (187 lb)   LMP 2023 (Exact Date)   BMI 34.20 kg/m²   See OB flow for LE edema, and cvx exam if applicable  FHT: 137 BPM   Uterine Size:  39 cm      Assessment and Plan:  Diagnoses and all orders for this visit:    1. Supervision of other normal pregnancy, antepartum (Primary)  Overview:  EDC: 10/12/2023 by LMP and anatomy US 19weeks    Prenatal genetic screening: Declined    Previous  delivery    COVID-19 vaccine: Recommended  Flu vaccine: Recommended  Tdap vaccine: Recommended, 2023 Rx    Assessment & Plan:  Continue prenatal vitamins  Fetal kick counts  Labor instructions    Orders:  -     POC Urinalysis Dipstick    2. Previous  delivery affecting pregnancy  Overview:  For breech presentation  Repeat  delivery 10/5/2023    Assessment & Plan:  We have reviewed the risks, benefits, and alternatives of the procedure including the risk of: bleeding, infection, hemorrhage, blood transfusion, risk of injury to nearby structures including: bowl, bladder, pelvic blood vessels and nerves, risk of injury to the baby, risk of anesthesia, venous thromboembolism, myocardial infarction, stroke, and death. We have also discussed the risks of repetitive  deliveries including the risk of abnormal placentation such as placenta accreta. The patient expresses her understanding of these risks and wishes to proceed.      3. Fetal size inconsistent with  dates  Overview:  Ultrasound 9/21/2023: EFW 8 pounds 1 ounce, 95th percentile.  The AC is at the 99th percentile.  LISANDRO 18.2.  Cephalic presentation.  Anterior placenta.            37w5d  Reassuring pregnancy progress    Counseling: OB precautions, leaking, VB, stephany capellan vs PTL/Labor  [facility]    Questions answered    Return in about 5 weeks (around 11/1/2023) for postpartum check.      Tyson Vidal MD  09/27/2023

## 2023-09-26 NOTE — H&P
WILIAM Syed  Obstetric History and Physical    No chief complaint on file.      Subjective     HPI:    Patient is a 28 y.o. female  currently at 37w5d, who presents to OB ED with/for contractions for about 2 hours. She has good fetal movement. She denies bleeding or leaking fluid.    She has been seeing [facility] [place] for her prenatal care.  The pregnancy has been complicated by prior  section scheduled for a repeat c/s at 39 wks.    The following portions of the patients history were reviewed and updated as appropriate:   current medications, allergies, past medical history, past surgical history, past family history, past social history and current problem list.     Prenatal Information:  Prenatal Results       Initial Prenatal Labs       Test Value Reference Range Date Time    Hemoglobin  14.4 g/dL 12.0 - 15.9 23 1039    Hematocrit  43.5 % 34.0 - 46.6 23 1039    Platelets  196 10*3/mm3 140 - 450 23 1039    Rubella IgG  5.54 index Immune >0.99 23 1039    Hepatitis B SAg  Non-Reactive  Non-Reactive 23 1039    Hepatitis C Ab  Non-Reactive  Non-Reactive 23 1039    RPR  NON-REACTIVE NA  20 1552    T. Pallidum Ab   Non-Reactive  Non-Reactive 23 1039    ABO  B   23 1039    Rh  Positive   23 1039    Antibody Screen  Negative   23 1039    HIV  Non-Reactive  Non-Reactive 23 1039    Urine Culture  No growth   23 1056    Gonorrhea  Negative  Negative 23 1039    Chlamydia  Negative  Negative 23 1039    TSH  2.300 m[iU]/L 0.270 - 4.200 20 1046    HgB A1c         Varicella IgG        HgB Electrophoresis         Cystic fibrosis                     2nd and 3rd Trimester       Test Value Reference Range Date Time    Hemoglobin (repeated)  11.9 g/dL 12.0 - 15.9 23 0948    Hematocrit (repeated)  35.8 % 34.0 - 46.6 23 0948    Platelets   213 10*3/mm3 140 - 450 23 0948       196 10*3/mm3 140 - 450 23 1039    GCT   148 mg/dL 74 - 180 23 0919       139 mg/dL 65 - 139 23 0948    Antibody Screen (repeated)  Negative   23 1039    GTT Fasting        GTT 1 Hr        GTT 2 Hr        GTT 3 Hr  95 mg/dL 74 - 140 23 1122    Group B Strep  Positive  Negative 23 1049               External Prenatal Results       Pregnancy Outside Results - Transcribed From Office Records - See Scanned Records For Details       Test Value Date Time    ABO  B  23 1039    Rh  Positive  23 1039    Antibody Screen  Negative  23 1039    Varicella IgG       Rubella  5.54 index 23 1039    Hgb  11.9 g/dL 23 0948       14.4 g/dL 23 1039    Hct  35.8 % 23 0948       43.5 % 23 1039    Glucose Fasting GTT       Glucose Tolerance Test 1 hour       Glucose Tolerance Test 3 hour  95 mg/dL 23 1122    Gonorrhea (discrete)  Negative  23 1039    Chlamydia (discrete)  Negative  23 1039    RPR  NON-REACTIVE NA 20 1552    VDRL       Syphilis Antibody       HBsAg  Non-Reactive  23 1039    Herpes Simplex Virus PCR       Herpes Simplex VIrus Culture       HIV  Non-Reactive  23 1039    Hep C RNA Quant PCR       Hep C Antibody  Non-Reactive  23 1039    AFP       Group B Strep  Positive  23 1049    GBS Susceptibility to Clindamycin       GBS Susceptibility to Erythromycin       Fetal Fibronectin       Genetic Testing, Maternal Blood                 Past OB History:     OB History    Para Term  AB Living   2 1 1 0 0 1   SAB IAB Ectopic Molar Multiple Live Births   0 0 0 0 0 1      # Outcome Date GA Lbr Eric/2nd Weight Sex Delivery Anes PTL Lv   2 Current            1 Term  40w0d  3118 g (6 lb 14 oz) M CS-LTranv   ALEJANDRO      Complications: Breech presentation       Past Medical History: Past Medical History:   Diagnosis Date    Abnormal Pap smear of cervix       Past Surgical History Past Surgical History:   Procedure Laterality Date      SECTION      TONSILLECTOMY        Family History: History reviewed. No pertinent family history.   Social History:  reports that she has never smoked. She has never used smokeless tobacco.   reports no history of alcohol use.   reports no history of drug use.        General ROS: Pertinent items are noted in HPI  Home Medications:  Prenatal Vit-Fe Fumarate-FA    Allergies:  Allergies   Allergen Reactions    Nickel Rash     contact derm       Objective       Vital Signs Range for the last 24 hours  Temperature: Temp:  [98.1 °F (36.7 °C)] 98.1 °F (36.7 °C)   Temp Source: Temp src: Oral   BP: BP: (127-133)/(73) 127/73   Pulse: Heart Rate:  [85-92] 85   Respirations: Resp:  [18] 18   SPO2: SpO2:  [98 %] 98 %     Physical Examination:   General appearance - alert, well appearing, and in no distress  Mental status - alert, oriented to person, place, and time  Chest - clear to auscultation  Heart - normal rate, regular rhythm,  Abdomen - soft, nontender, nondistended  Pelvic - closed  Back exam - full range of motion, no tenderness or pain on motion  Neurological - alert, oriented, normal speech  Extremities - Edema Trace; DTR 1-2+  Skin - normal coloration and turgor, no suspicious skin lesions noted    Presentation:    Cervix: Method: sterile exam per RN   Dilation: Cervical Dilation (cm): 0-1   Effacement: Cervical Effacement: 50-60%   Station:         Fetal Heart Rate Assessment :      Beats/min: Fetal HR (beats/min): 130   Baseline: Fetal HR Baseline: normal range   Variability: Fetal HR Variability: moderate (amplitude range 6 to 25 bpm)   Accels: Fetal HR Accelerations: episodic, greater than/equal to 15 bpm, lasting at least 15 seconds   Decels: Fetal HR Decelerations: absent   Tracing Category:       Uterine Assessment   Method: Method: palpation, external tocotransducer   Frequency (min): Contraction Frequency (Minutes): 2-5   Ctx Count in 10 min:     Duration:     Intensity: Contraction Intensity: mild by  palpation   [place] Units:       Assessment & Plan     Assessment:  -  Intrauterine pregnancy at 37w5d gestation who presents for: contractions  -  Prior  section  -  GBS status:   Group B Strep, DNA   Date Value Ref Range Status   2023 Positive (A) Negative Final       Plan:  - Prior  section with contractions. Palpate mild. Cervix is closed. Was closed one week ago. FHR reactive. Recommend watch for 2 hours and see if cx changes.  - Plan of care has been reviewed with patient and patient agrees.   - Risks, benefits of treatment plan have been discussed.  - All questions have been answered.        Electronically signed by Neema Ruiz MD, 23, 12:06 AM EDT.

## 2023-09-27 ENCOUNTER — ROUTINE PRENATAL (OUTPATIENT)
Dept: OBSTETRICS AND GYNECOLOGY | Facility: CLINIC | Age: 28
End: 2023-09-27
Payer: COMMERCIAL

## 2023-09-27 VITALS — BODY MASS INDEX: 34.2 KG/M2 | WEIGHT: 187 LBS | DIASTOLIC BLOOD PRESSURE: 68 MMHG | SYSTOLIC BLOOD PRESSURE: 117 MMHG

## 2023-09-27 DIAGNOSIS — Z34.80 SUPERVISION OF OTHER NORMAL PREGNANCY, ANTEPARTUM: Primary | ICD-10-CM

## 2023-09-27 DIAGNOSIS — O26.849 FETAL SIZE INCONSISTENT WITH DATES: ICD-10-CM

## 2023-09-27 DIAGNOSIS — O34.219 PREVIOUS CESAREAN DELIVERY AFFECTING PREGNANCY: ICD-10-CM

## 2023-09-27 LAB
GLUCOSE UR STRIP-MCNC: NEGATIVE MG/DL
PROT UR STRIP-MCNC: NEGATIVE MG/DL

## 2023-10-05 ENCOUNTER — ANESTHESIA (OUTPATIENT)
Dept: LABOR AND DELIVERY | Facility: HOSPITAL | Age: 28
End: 2023-10-05
Payer: COMMERCIAL

## 2023-10-05 ENCOUNTER — HOSPITAL ENCOUNTER (INPATIENT)
Facility: HOSPITAL | Age: 28
LOS: 2 days | Discharge: HOME | End: 2023-10-07
Attending: OBSTETRICS & GYNECOLOGY | Admitting: OBSTETRICS & GYNECOLOGY
Payer: COMMERCIAL

## 2023-10-05 ENCOUNTER — ANESTHESIA EVENT (OUTPATIENT)
Dept: LABOR AND DELIVERY | Facility: HOSPITAL | Age: 28
End: 2023-10-05
Payer: COMMERCIAL

## 2023-10-05 DIAGNOSIS — O34.219 PREVIOUS CESAREAN DELIVERY AFFECTING PREGNANCY: ICD-10-CM

## 2023-10-05 PROBLEM — Z34.80 SUPERVISION OF OTHER NORMAL PREGNANCY, ANTEPARTUM: Status: RESOLVED | Noted: 2023-04-17 | Resolved: 2023-10-05

## 2023-10-05 LAB
ABO GROUP BLD: NORMAL
BASE EXCESS BLDCOA CALC-SCNC: -1.9 MMOL/L (ref -2–2)
BASE EXCESS BLDCOV CALC-SCNC: -3.8 MMOL/L (ref -2–2)
BASOPHILS # BLD AUTO: 0.02 10*3/MM3 (ref 0–0.2)
BASOPHILS NFR BLD AUTO: 0.2 % (ref 0–1.5)
BLD GP AB SCN SERPL QL: NEGATIVE
DEPRECATED RDW RBC AUTO: 37.7 FL (ref 37–54)
EOSINOPHIL # BLD AUTO: 0.03 10*3/MM3 (ref 0–0.4)
EOSINOPHIL NFR BLD AUTO: 0.3 % (ref 0.3–6.2)
ERYTHROCYTE [DISTWIDTH] IN BLOOD BY AUTOMATED COUNT: 13.2 % (ref 12.3–15.4)
HCO3 BLDCOA-SCNC: 25 MMOL/L
HCO3 BLDCOV-SCNC: 21.2 MMOL/L
HCT VFR BLD AUTO: 31.5 % (ref 34–46.6)
HGB BLD-MCNC: 10 G/DL (ref 12–15.9)
IMM GRANULOCYTES # BLD AUTO: 0.04 10*3/MM3 (ref 0–0.05)
IMM GRANULOCYTES NFR BLD AUTO: 0.4 % (ref 0–0.5)
LYMPHOCYTES # BLD AUTO: 1.58 10*3/MM3 (ref 0.7–3.1)
LYMPHOCYTES NFR BLD AUTO: 17.5 % (ref 19.6–45.3)
MCH RBC QN AUTO: 24.9 PG (ref 26.6–33)
MCHC RBC AUTO-ENTMCNC: 31.7 G/DL (ref 31.5–35.7)
MCV RBC AUTO: 78.6 FL (ref 79–97)
MONOCYTES # BLD AUTO: 0.52 10*3/MM3 (ref 0.1–0.9)
MONOCYTES NFR BLD AUTO: 5.8 % (ref 5–12)
NEUTROPHILS NFR BLD AUTO: 6.84 10*3/MM3 (ref 1.7–7)
NEUTROPHILS NFR BLD AUTO: 75.8 % (ref 42.7–76)
NRBC BLD AUTO-RTO: 0 /100 WBC (ref 0–0.2)
PCO2 BLDCOA: 50.5 MMHG (ref 33–49)
PCO2 BLDCOV: 38.8 MM HG (ref 28–40)
PH BLDCOA: 7.31 PH UNITS (ref 7.21–7.31)
PH BLDCOV: 7.36 PH UNITS (ref 7.31–7.37)
PLATELET # BLD AUTO: 196 10*3/MM3 (ref 140–450)
PMV BLD AUTO: 11.5 FL (ref 6–12)
PO2 BLDCOA: 17.4 MMHG
PO2 BLDCOV: 25.4 MM HG (ref 21–31)
RBC # BLD AUTO: 4.01 10*6/MM3 (ref 3.77–5.28)
RH BLD: POSITIVE
T&S EXPIRATION DATE: NORMAL
WBC NRBC COR # BLD: 9.03 10*3/MM3 (ref 3.4–10.8)

## 2023-10-05 PROCEDURE — 25010000002 BUPIVACAINE PF 0.75 % SOLUTION: Performed by: ANESTHESIOLOGY

## 2023-10-05 PROCEDURE — 86901 BLOOD TYPING SEROLOGIC RH(D): CPT | Performed by: OBSTETRICS & GYNECOLOGY

## 2023-10-05 PROCEDURE — 85025 COMPLETE CBC W/AUTO DIFF WBC: CPT | Performed by: OBSTETRICS & GYNECOLOGY

## 2023-10-05 PROCEDURE — 25010000002 FENTANYL CITRATE (PF) 50 MCG/ML SOLUTION: Performed by: ANESTHESIOLOGY

## 2023-10-05 PROCEDURE — 0 MORPHINE PER 10 MG: Performed by: ANESTHESIOLOGY

## 2023-10-05 PROCEDURE — 82803 BLOOD GASES ANY COMBINATION: CPT | Performed by: OBSTETRICS & GYNECOLOGY

## 2023-10-05 PROCEDURE — 86900 BLOOD TYPING SEROLOGIC ABO: CPT | Performed by: OBSTETRICS & GYNECOLOGY

## 2023-10-05 PROCEDURE — 25010000002 KETOROLAC TROMETHAMINE PER 15 MG: Performed by: OBSTETRICS & GYNECOLOGY

## 2023-10-05 PROCEDURE — 86850 RBC ANTIBODY SCREEN: CPT | Performed by: OBSTETRICS & GYNECOLOGY

## 2023-10-05 PROCEDURE — 25010000002 CEFAZOLIN IN DEXTROSE 2-4 GM/100ML-% SOLUTION: Performed by: OBSTETRICS & GYNECOLOGY

## 2023-10-05 PROCEDURE — 25810000003 LACTATED RINGERS PER 1000 ML: Performed by: OBSTETRICS & GYNECOLOGY

## 2023-10-05 PROCEDURE — 25810000003 LACTATED RINGERS PER 1000 ML: Performed by: NURSE ANESTHETIST, CERTIFIED REGISTERED

## 2023-10-05 PROCEDURE — 25810000003 LACTATED RINGERS SOLUTION: Performed by: OBSTETRICS & GYNECOLOGY

## 2023-10-05 PROCEDURE — 25010000002 OXYTOCIN PER 10 UNITS: Performed by: NURSE ANESTHETIST, CERTIFIED REGISTERED

## 2023-10-05 RX ORDER — ACETAMINOPHEN 500 MG
1000 TABLET ORAL EVERY 6 HOURS
Status: COMPLETED | OUTPATIENT
Start: 2023-10-05 | End: 2023-10-06

## 2023-10-05 RX ORDER — FAMOTIDINE 20 MG/1
20 TABLET, FILM COATED ORAL ONCE AS NEEDED
Status: DISCONTINUED | OUTPATIENT
Start: 2023-10-05 | End: 2023-10-05 | Stop reason: HOSPADM

## 2023-10-05 RX ORDER — OXYCODONE HYDROCHLORIDE AND ACETAMINOPHEN 5; 325 MG/1; MG/1
1-2 TABLET ORAL EVERY 6 HOURS PRN
Qty: 20 TABLET | Refills: 0 | Status: SHIPPED | OUTPATIENT
Start: 2023-10-05

## 2023-10-05 RX ORDER — DOCUSATE SODIUM 100 MG/1
100 CAPSULE, LIQUID FILLED ORAL 2 TIMES DAILY
Status: DISCONTINUED | OUTPATIENT
Start: 2023-10-05 | End: 2023-10-07 | Stop reason: HOSPADM

## 2023-10-05 RX ORDER — CITRIC ACID/SODIUM CITRATE 334-500MG
30 SOLUTION, ORAL ORAL ONCE
Status: COMPLETED | OUTPATIENT
Start: 2023-10-05 | End: 2023-10-05

## 2023-10-05 RX ORDER — BISACODYL 10 MG
10 SUPPOSITORY, RECTAL RECTAL DAILY PRN
Status: DISCONTINUED | OUTPATIENT
Start: 2023-10-05 | End: 2023-10-07 | Stop reason: HOSPADM

## 2023-10-05 RX ORDER — IBUPROFEN 600 MG/1
600 TABLET ORAL EVERY 6 HOURS
Status: DISCONTINUED | OUTPATIENT
Start: 2023-10-06 | End: 2023-10-07 | Stop reason: HOSPADM

## 2023-10-05 RX ORDER — DIPHENHYDRAMINE HYDROCHLORIDE 50 MG/ML
12.5 INJECTION INTRAMUSCULAR; INTRAVENOUS EVERY 6 HOURS PRN
Status: ACTIVE | OUTPATIENT
Start: 2023-10-05 | End: 2023-10-06

## 2023-10-05 RX ORDER — METHYLERGONOVINE MALEATE 0.2 MG/ML
200 INJECTION INTRAVENOUS ONCE AS NEEDED
Status: DISCONTINUED | OUTPATIENT
Start: 2023-10-05 | End: 2023-10-05 | Stop reason: HOSPADM

## 2023-10-05 RX ORDER — NALOXONE HYDROCHLORIDE 4 MG/.1ML
SPRAY NASAL
Qty: 2 EACH | Refills: 0 | Status: SHIPPED | OUTPATIENT
Start: 2023-10-05

## 2023-10-05 RX ORDER — ALUMINA, MAGNESIA, AND SIMETHICONE 2400; 2400; 240 MG/30ML; MG/30ML; MG/30ML
15 SUSPENSION ORAL EVERY 4 HOURS PRN
Status: DISCONTINUED | OUTPATIENT
Start: 2023-10-05 | End: 2023-10-07 | Stop reason: HOSPADM

## 2023-10-05 RX ORDER — FAMOTIDINE 10 MG/ML
20 INJECTION, SOLUTION INTRAVENOUS ONCE AS NEEDED
Status: DISCONTINUED | OUTPATIENT
Start: 2023-10-05 | End: 2023-10-05 | Stop reason: HOSPADM

## 2023-10-05 RX ORDER — ONDANSETRON 2 MG/ML
4 INJECTION INTRAMUSCULAR; INTRAVENOUS EVERY 6 HOURS PRN
Status: DISCONTINUED | OUTPATIENT
Start: 2023-10-05 | End: 2023-10-05 | Stop reason: HOSPADM

## 2023-10-05 RX ORDER — OXYTOCIN/0.9 % SODIUM CHLORIDE 30/500 ML
125 PLASTIC BAG, INJECTION (ML) INTRAVENOUS CONTINUOUS PRN
Status: DISCONTINUED | OUTPATIENT
Start: 2023-10-05 | End: 2023-10-07 | Stop reason: HOSPADM

## 2023-10-05 RX ORDER — DIPHENHYDRAMINE HCL 25 MG
25 CAPSULE ORAL EVERY 6 HOURS PRN
Status: ACTIVE | OUTPATIENT
Start: 2023-10-05 | End: 2023-10-06

## 2023-10-05 RX ORDER — KETOROLAC TROMETHAMINE 15 MG/ML
15 INJECTION, SOLUTION INTRAMUSCULAR; INTRAVENOUS EVERY 6 HOURS
Status: COMPLETED | OUTPATIENT
Start: 2023-10-05 | End: 2023-10-06

## 2023-10-05 RX ORDER — SODIUM CHLORIDE, SODIUM LACTATE, POTASSIUM CHLORIDE, CALCIUM CHLORIDE 600; 310; 30; 20 MG/100ML; MG/100ML; MG/100ML; MG/100ML
150 INJECTION, SOLUTION INTRAVENOUS CONTINUOUS
Status: DISCONTINUED | OUTPATIENT
Start: 2023-10-05 | End: 2023-10-05

## 2023-10-05 RX ORDER — ONDANSETRON 2 MG/ML
4 INJECTION INTRAMUSCULAR; INTRAVENOUS EVERY 6 HOURS PRN
Status: DISCONTINUED | OUTPATIENT
Start: 2023-10-05 | End: 2023-10-07 | Stop reason: HOSPADM

## 2023-10-05 RX ORDER — SODIUM CHLORIDE 0.9 % (FLUSH) 0.9 %
10 SYRINGE (ML) INJECTION AS NEEDED
Status: DISCONTINUED | OUTPATIENT
Start: 2023-10-05 | End: 2023-10-05 | Stop reason: HOSPADM

## 2023-10-05 RX ORDER — OXYCODONE HYDROCHLORIDE 5 MG/1
10 TABLET ORAL EVERY 4 HOURS PRN
Status: DISCONTINUED | OUTPATIENT
Start: 2023-10-05 | End: 2023-10-07 | Stop reason: HOSPADM

## 2023-10-05 RX ORDER — MISOPROSTOL 200 UG/1
800 TABLET ORAL AS NEEDED
Status: DISCONTINUED | OUTPATIENT
Start: 2023-10-05 | End: 2023-10-05 | Stop reason: HOSPADM

## 2023-10-05 RX ORDER — BUPIVACAINE HYDROCHLORIDE 7.5 MG/ML
INJECTION, SOLUTION EPIDURAL; RETROBULBAR
Status: COMPLETED | OUTPATIENT
Start: 2023-10-05 | End: 2023-10-05

## 2023-10-05 RX ORDER — SODIUM CHLORIDE 9 MG/ML
40 INJECTION, SOLUTION INTRAVENOUS AS NEEDED
Status: DISCONTINUED | OUTPATIENT
Start: 2023-10-05 | End: 2023-10-05 | Stop reason: HOSPADM

## 2023-10-05 RX ORDER — IBUPROFEN 600 MG/1
600 TABLET ORAL EVERY 6 HOURS PRN
Qty: 60 TABLET | Refills: 1 | Status: SHIPPED | OUTPATIENT
Start: 2023-10-05

## 2023-10-05 RX ORDER — ACETAMINOPHEN 500 MG
1000 TABLET ORAL ONCE
Status: COMPLETED | OUTPATIENT
Start: 2023-10-05 | End: 2023-10-05

## 2023-10-05 RX ORDER — SODIUM CHLORIDE 0.9 % (FLUSH) 0.9 %
10 SYRINGE (ML) INJECTION EVERY 12 HOURS SCHEDULED
Status: DISCONTINUED | OUTPATIENT
Start: 2023-10-05 | End: 2023-10-05 | Stop reason: HOSPADM

## 2023-10-05 RX ORDER — EPHEDRINE SULFATE 50 MG/ML
INJECTION, SOLUTION INTRAVENOUS AS NEEDED
Status: DISCONTINUED | OUTPATIENT
Start: 2023-10-05 | End: 2023-10-05 | Stop reason: SURG

## 2023-10-05 RX ORDER — DEXMEDETOMIDINE HYDROCHLORIDE 100 UG/ML
INJECTION, SOLUTION INTRAVENOUS AS NEEDED
Status: DISCONTINUED | OUTPATIENT
Start: 2023-10-05 | End: 2023-10-05 | Stop reason: SURG

## 2023-10-05 RX ORDER — OXYCODONE HYDROCHLORIDE 5 MG/1
5 TABLET ORAL EVERY 4 HOURS PRN
Status: DISCONTINUED | OUTPATIENT
Start: 2023-10-05 | End: 2023-10-07 | Stop reason: HOSPADM

## 2023-10-05 RX ORDER — MEPERIDINE HYDROCHLORIDE 25 MG/ML
12.5 INJECTION INTRAMUSCULAR; INTRAVENOUS; SUBCUTANEOUS
Status: DISCONTINUED | OUTPATIENT
Start: 2023-10-05 | End: 2023-10-05 | Stop reason: HOSPADM

## 2023-10-05 RX ORDER — CALCIUM CARBONATE 500 MG/1
1 TABLET, CHEWABLE ORAL EVERY 4 HOURS PRN
Status: DISCONTINUED | OUTPATIENT
Start: 2023-10-05 | End: 2023-10-07 | Stop reason: HOSPADM

## 2023-10-05 RX ORDER — DOCUSATE SODIUM 100 MG/1
100 CAPSULE, LIQUID FILLED ORAL 2 TIMES DAILY
Qty: 60 CAPSULE | Refills: 1 | Status: SHIPPED | OUTPATIENT
Start: 2023-10-05

## 2023-10-05 RX ORDER — HYDROMORPHONE HYDROCHLORIDE 2 MG/ML
0.25 INJECTION, SOLUTION INTRAMUSCULAR; INTRAVENOUS; SUBCUTANEOUS
Status: DISCONTINUED | OUTPATIENT
Start: 2023-10-05 | End: 2023-10-05 | Stop reason: HOSPADM

## 2023-10-05 RX ORDER — NALOXONE HCL 0.4 MG/ML
0.4 VIAL (ML) INJECTION ONCE AS NEEDED
Status: ACTIVE | OUTPATIENT
Start: 2023-10-05 | End: 2023-10-06

## 2023-10-05 RX ORDER — CEFAZOLIN SODIUM 2 G/100ML
2 INJECTION, SOLUTION INTRAVENOUS ONCE
Status: COMPLETED | OUTPATIENT
Start: 2023-10-05 | End: 2023-10-05

## 2023-10-05 RX ORDER — HYDROMORPHONE HYDROCHLORIDE 2 MG/ML
0.5 INJECTION, SOLUTION INTRAMUSCULAR; INTRAVENOUS; SUBCUTANEOUS
Status: DISCONTINUED | OUTPATIENT
Start: 2023-10-05 | End: 2023-10-05 | Stop reason: HOSPADM

## 2023-10-05 RX ORDER — KETOROLAC TROMETHAMINE 30 MG/ML
30 INJECTION, SOLUTION INTRAMUSCULAR; INTRAVENOUS ONCE
Status: COMPLETED | OUTPATIENT
Start: 2023-10-05 | End: 2023-10-05

## 2023-10-05 RX ORDER — CARBOPROST TROMETHAMINE 250 UG/ML
250 INJECTION, SOLUTION INTRAMUSCULAR AS NEEDED
Status: DISCONTINUED | OUTPATIENT
Start: 2023-10-05 | End: 2023-10-05 | Stop reason: HOSPADM

## 2023-10-05 RX ORDER — ONDANSETRON 4 MG/1
4 TABLET, FILM COATED ORAL EVERY 6 HOURS PRN
Status: DISCONTINUED | OUTPATIENT
Start: 2023-10-05 | End: 2023-10-05 | Stop reason: HOSPADM

## 2023-10-05 RX ORDER — MORPHINE SULFATE 0.5 MG/ML
INJECTION, SOLUTION EPIDURAL; INTRATHECAL; INTRAVENOUS
Status: COMPLETED | OUTPATIENT
Start: 2023-10-05 | End: 2023-10-05

## 2023-10-05 RX ORDER — ACETAMINOPHEN 325 MG/1
650 TABLET ORAL EVERY 6 HOURS
Status: DISCONTINUED | OUTPATIENT
Start: 2023-10-06 | End: 2023-10-07 | Stop reason: HOSPADM

## 2023-10-05 RX ORDER — METOCLOPRAMIDE HYDROCHLORIDE 5 MG/ML
10 INJECTION INTRAMUSCULAR; INTRAVENOUS
Status: DISCONTINUED | OUTPATIENT
Start: 2023-10-06 | End: 2023-10-05

## 2023-10-05 RX ORDER — OXYTOCIN/0.9 % SODIUM CHLORIDE 30/500 ML
125 PLASTIC BAG, INJECTION (ML) INTRAVENOUS ONCE
Status: COMPLETED | OUTPATIENT
Start: 2023-10-05 | End: 2023-10-05

## 2023-10-05 RX ORDER — TRANEXAMIC ACID 10 MG/ML
1000 INJECTION, SOLUTION INTRAVENOUS ONCE AS NEEDED
Status: DISCONTINUED | OUTPATIENT
Start: 2023-10-05 | End: 2023-10-05 | Stop reason: HOSPADM

## 2023-10-05 RX ORDER — FENTANYL CITRATE 50 UG/ML
INJECTION, SOLUTION INTRAMUSCULAR; INTRAVENOUS
Status: COMPLETED | OUTPATIENT
Start: 2023-10-05 | End: 2023-10-05

## 2023-10-05 RX ORDER — LIDOCAINE HYDROCHLORIDE 10 MG/ML
0.5 INJECTION, SOLUTION INFILTRATION; PERINEURAL ONCE AS NEEDED
Status: DISCONTINUED | OUTPATIENT
Start: 2023-10-05 | End: 2023-10-05 | Stop reason: HOSPADM

## 2023-10-05 RX ADMIN — SODIUM CHLORIDE, POTASSIUM CHLORIDE, SODIUM LACTATE AND CALCIUM CHLORIDE 150 ML/HR: 600; 310; 30; 20 INJECTION, SOLUTION INTRAVENOUS at 08:43

## 2023-10-05 RX ADMIN — SODIUM CHLORIDE, POTASSIUM CHLORIDE, SODIUM LACTATE AND CALCIUM CHLORIDE 1000 ML: 600; 310; 30; 20 INJECTION, SOLUTION INTRAVENOUS at 08:15

## 2023-10-05 RX ADMIN — OXYTOCIN 999 MILLI-UNITS/MIN: 10 INJECTION, SOLUTION INTRAMUSCULAR; INTRAVENOUS at 09:28

## 2023-10-05 RX ADMIN — DOCUSATE SODIUM 100 MG: 100 CAPSULE, LIQUID FILLED ORAL at 21:46

## 2023-10-05 RX ADMIN — DEXMEDETOMIDINE 20 MCG: 100 INJECTION, SOLUTION INTRAVENOUS at 09:35

## 2023-10-05 RX ADMIN — ACETAMINOPHEN 1000 MG: 500 TABLET ORAL at 21:30

## 2023-10-05 RX ADMIN — ACETAMINOPHEN 1000 MG: 500 TABLET ORAL at 14:59

## 2023-10-05 RX ADMIN — KETOROLAC TROMETHAMINE 15 MG: 15 INJECTION, SOLUTION INTRAMUSCULAR; INTRAVENOUS at 17:42

## 2023-10-05 RX ADMIN — MORPHINE SULFATE 0.1 MG: 0.5 INJECTION, SOLUTION EPIDURAL; INTRATHECAL; INTRAVENOUS at 09:07

## 2023-10-05 RX ADMIN — BUPIVACAINE HYDROCHLORIDE 1.5 ML: 7.5 INJECTION, SOLUTION EPIDURAL; RETROBULBAR at 09:07

## 2023-10-05 RX ADMIN — EPHEDRINE SULFATE 5 MG: 50 INJECTION INTRAVENOUS at 09:09

## 2023-10-05 RX ADMIN — KETOROLAC TROMETHAMINE 30 MG: 30 INJECTION, SOLUTION INTRAMUSCULAR; INTRAVENOUS at 11:19

## 2023-10-05 RX ADMIN — Medication 125 ML/HR: at 11:08

## 2023-10-05 RX ADMIN — FENTANYL CITRATE 15 MCG: 50 INJECTION, SOLUTION INTRAMUSCULAR; INTRAVENOUS at 09:07

## 2023-10-05 RX ADMIN — CEFAZOLIN SODIUM 2 G: 2 INJECTION, SOLUTION INTRAVENOUS at 08:44

## 2023-10-05 RX ADMIN — SODIUM CITRATE AND CITRIC ACID MONOHYDRATE 30 ML: 500; 334 SOLUTION ORAL at 08:43

## 2023-10-05 RX ADMIN — ACETAMINOPHEN 1000 MG: 500 TABLET ORAL at 08:44

## 2023-10-05 NOTE — LACTATION NOTE
LC in to assist with this first feeding in the recovery room. Infant was very eager to latch but has some nasal stuffiness that seems to be somewhat distracting for the baby. Good swallows and comfort noted at this feeding. Infant has a tight tongue frenulum. Patient states her other child struggled with this as well. Baby switched sides easily.

## 2023-10-05 NOTE — PLAN OF CARE
Problem: Bleeding ( Delivery)  Goal: Bleeding is Controlled  Outcome: Met     Problem: Change in Fetal Wellbeing ( Delivery)  Goal: Stable Fetal Wellbeing  Outcome: Met     Problem: Infection ( Delivery)  Goal: Absence of Infection Signs and Symptoms  Outcome: Met     Problem: Respiratory Compromise ( Delivery)  Goal: Effective Oxygenation and Ventilation  Outcome: Met     Problem: Breastfeeding  Goal: Effective Breastfeeding  Outcome: Met   Goal Outcome Evaluation:

## 2023-10-05 NOTE — L&D DELIVERY NOTE
Syed   Section Operative Note    Pre-Operative Dx:   1.  IUP at Gestational Age: 39w0d  weeks    2.  Review of  delivery   3.  Fetal size greater than dates     Postoperative dx:    1.  Same  2.  2.5 cm anterior uterine fibroid     Procedure: Procedure(s):   SECTION REPEAT   Surgeon/Assistant: Surgeon(s):  Tyson Vidal MD Kahleifeh, Basim, MD          Anesthesia:  Anesthesiologist: Choice  Anesthesiologist: Palmer Garcia MD  CRNA: Everardo Kraft CRNA     EBL: 600  mls.                  Antibiotics: cefazolin (Ancef)     Infant:           Gender: female  infant    Weight: 3860 g (8 lb 8.2 oz)     Apgars: 8  @ 1 minute /     9  @ 5 minutes    Cord gases: Venous:    pH, Cord Venous   Date Value Ref Range Status   10/05/2023 7.356 7.310 - 7.370 pH Units Final        Arterial:    pH, Cord Arterial   Date Value Ref Range Status   10/05/2023 7.31 7.21 - 7.31 pH Units Final        Indication for C/Section:  Prior C/S         Priority for C/Section:  routine      Procedure Details:   After reviewing the informed consent, the patient expressed her understanding and desire to proceed.  She was taken to the operating room with an IV in place and running.  She was placed on the operating table in the sitting position.  The patient was given a spinal anesthetic.  She was placed in the dorsal supine position with leftward tilt.  A sterile Luna catheter was inserted into the patient's bladder.  The patient was then prepped and draped in the usual sterile fashion. A Traxi abdominal retractor was placed to retract the patient's abdomen. After a surgical timeout was performed, and the patient's anesthesia was found to be adequate I made a Pfannenstiel skin incision with the scalpel.  The incision was carried down to the underlying fascia with the cautery. The fascia was nicked in the midline. The fascia was extended laterally, in a curvilinear fashion with Ta scissors.  I used 2  kochers to grasp the superior portion of the fascia, and this was taken off the rectus muscle sharply.  The kochers were removed and replaced on the posterior portion of the fascia. The fascia was taken off the rectus muscle sharply.  The midline was identified, tented up with 2 hemostats, and entered sharply.  The peritoneum was extended in a sharp fashion with good visualization of the bladder.  The bladder blade was placed.  The bladder flap was developed sharply.  I made a low transverse uterine incision with the scalpel.  The uterine cavity was entered bluntly, and membranes were artificially ruptured.  The fluid color was clear.  The fetal head was gently lifted to the hysterotomy, and the baby was delivered with gentle fundal pressure at 9:27 AM.  After complete delivery of the infant, the mouth and nose were bulb suctioned, the cord was doubly clamped and cut, and the baby was passed off to the awaiting nurses. Apgars were 8 and 9 at 1 and 5 minutes respectively. The baby's birth weight was 8 pounds 8 ounces. Cord blood and gases were collected, and the placenta was delivered manually and intact at 9:28 AM.  The uterus was then exteriorized, and the endometrium was curetted with a dry lap.  The hysterotomy was then closed in 2 layers.  The first layer was closed with a running 0 Monocryl suture.  The second layer was closed with a horizontal imbricating 0 Monocryl suture.  There was excellent hemostasis after closure of both layers.  A hematoma measuring about 3 cm x 4 cm had developed on the anterior uterine surface at the left side of the hysterotomy just below the serosa.  This was monitored for several minutes.  It appeared stable with no further expansion.  I irrigated posterior to the uterus.  The uterus was reinserted into the peritoneal cavity.  The paracolic gutters were copiously irrigated.  The hysterotomy was reinspected, and noted to be hemostatic.  The hematoma was once again inspected.  There  is no bleeding in this area.  The hematoma appears to be the same size as prior to placing the uterus back into the abdomen, suggesting instability.  3 Sole clamps were used to grasp the peritoneum, and the peritoneum was closed with a 3-0 Monocryl suture in a running fashion.  The rectus muscle was reapproximated with 3 interrupted horizontal mattress sutures using a #1 chromic suture.  The fascia was then closed in a running fashion using a 0 Vicryl suture.  The subcutaneous spaces were copiously irrigated, and hemostasis was achieved with the cautery.  The subcutaneous space was then reapproximated with a 3-0 Monocryl suture in a running fashion.  The skin was closed with a 4-0 Monocryl suture in a subcuticular fashion.  A sterile towels applied over the incision.  The patient was placed in a frog-leg position, and the drapes were removed.  I expressed any remaining blood and clot from the uterus.  A sterile bandage was applied to the incision, and the patient was transferred to the recovery room in satisfactory condition.  The patient received Kefzol as her preoperative antibiotics.  All counts were correct x2 at the end of the procedure.  Both mother and  are recovering in excellent condition in the recovery area.      Complications:   None         Dr. Konstantin Michel acted as my surgical assistant for this procedure.  His responsibilities included retraction, suction, irrigation and assistance with delivery of the fetus          Tyson Vidal MD  10/5/2023  10:02 EDT

## 2023-10-05 NOTE — PLAN OF CARE
Problem: Adult Inpatient Plan of Care  Goal: Plan of Care Review  Outcome: Ongoing, Progressing  Goal: Patient-Specific Goal (Individualized)  Outcome: Ongoing, Progressing  Goal: Absence of Hospital-Acquired Illness or Injury  Outcome: Ongoing, Progressing  Intervention: Identify and Manage Fall Risk  Description: Perform standard risk assessment on admission using a validated tool or comprehensive approach appropriate to the patient; reassess fall risk frequently, with change in status or transfer to another level of care.  Communicate fall injury risk to interprofessional healthcare team.  Determine need for increased observation, equipment and environmental modification, such as low bed, signage and supportive, nonskid footwear.  Adjust safety measures to individual developmental age, stage and identified risk factors.  Reinforce the importance of safety and physical activity with patient and family.  Perform regular intentional rounding to assess need for position change, pain assessment and personal needs, including assistance with toileting.  Recent Flowsheet Documentation  Taken 10/5/2023 1357 by Nelida Alejo RN  Safety Promotion/Fall Prevention: safety round/check completed  Taken 10/5/2023 1100 by Nelida Alejo RN  Safety Promotion/Fall Prevention: safety round/check completed  Intervention: Prevent and Manage VTE (Venous Thromboembolism) Risk  Description: Assess for VTE (venous thromboembolism) risk.  Encourage and assist with early ambulation.  Initiate and maintain compression or other therapy, as indicated, based on identified risk in accordance with organizational protocol and provider order.  Encourage both active and passive leg exercises while in bed, if unable to ambulate.  Recent Flowsheet Documentation  Taken 10/5/2023 0817 by Nelida Alejo RN  VTE Prevention/Management:   bilateral   sequential compression devices on  Goal: Optimal Comfort and Wellbeing  Outcome: Ongoing,  Progressing  Intervention: Provide Person-Centered Care  Description: Use a family-focused approach to care.  Develop trust and rapport by proactively providing information, encouraging questions, addressing concerns and offering reassurance.  Acknowledge emotional response to hospitalization.  Recognize and utilize personal coping strategies.  Honor spiritual and cultural preferences.  Recent Flowsheet Documentation  Taken 10/5/2023 1100 by Nelida Alejo, RN  Trust Relationship/Rapport:   care explained   choices provided   emotional support provided   empathic listening provided   questions answered   thoughts/feelings acknowledged   reassurance provided   questions encouraged  Goal: Readiness for Transition of Care  Outcome: Ongoing, Progressing  Intervention: Mutually Develop Transition Plan  Description: Identify available resources for support (e.g., family, friends, community).  Identify and address barriers to ongoing treatment and home management (e.g., environmental, financial).  Provide opportunities to practice self-management skills.  Assess and monitor emotional readiness for transition.  Establish or reconnect linkage with outpatient providers or community-based services.  Recent Flowsheet Documentation  Taken 10/5/2023 0822 by Nelida Alejo, RN  Equipment Currently Used at Home: none  Taken 10/5/2023 0821 by Nelida Alejo, RN  Transportation Anticipated: family or friend will provide  Patient/Family Anticipated Services at Transition: none  Patient/Family Anticipates Transition to: home with family     Problem: Adjustment to Role Transition (Postpartum  Delivery)  Goal: Successful Maternal Role Transition  Outcome: Ongoing, Progressing     Problem: Bleeding (Postpartum  Delivery)  Goal: Hemostasis  Outcome: Ongoing, Progressing     Problem: Infection (Postpartum  Delivery)  Goal: Absence of Infection Signs and Symptoms  Outcome: Ongoing, Progressing     Problem: Pain  (Postpartum  Delivery)  Goal: Acceptable Pain Control  Outcome: Ongoing, Progressing     Problem: Postoperative Nausea and Vomiting (Postpartum  Delivery)  Goal: Nausea and Vomiting Relief  Outcome: Ongoing, Progressing     Problem: Postoperative Urinary Retention (Postpartum  Delivery)  Goal: Effective Urinary Elimination  Outcome: Ongoing, Progressing   Goal Outcome Evaluation:

## 2023-10-05 NOTE — DISCHARGE SUMMARY
Psychiatric         DISCHARGE SUMMARY    Patient Name: Mercedes Hanson  : 1995  MRN: [id number]    Date of Admission: 10/5/2023  Date of Discharge:  10/7/2023   Primary Care Physician: Neema Smith APRN    Consults       No orders found from 2023 to 10/6/2023.             Procedures:  Repeat low-transverse  delivery    Presenting Problem:   Previous  delivery affecting pregnancy [O34.219]    Admitting Diagnosis:  28-year-old  2 para 1 at 39 weeks of gestation  Previous  delivery  Antepartum anemia  Fetal size greater than dates    Discharge Diagnosis:  28-year-old  2 para 1 at 39 weeks of gestation  Previous  delivery  Antepartum anemia  Fetal size greater than dates  Repeat low-transverse  delivery    Delivery Summary     OB Surgeon: Tyson Vidal MD  Anesthesia: Spinal  Delivery Type:  LTCS  Perineum: OBPERINEUM: NA  Feeding method: Breast    Infant: female  infant;    Weight: 3860 g (8 lb 8.2 oz)     APGARS: 8  @ 1 minute / 9  @ 5 minutes   Venous Blood Gas:   pH, Cord Venous   Date Value Ref Range Status   10/05/2023 7.356 7.310 - 7.370 pH Units Final     Base Excess, Cord Venous   Date Value Ref Range Status   10/05/2023 -3.8 (L) -2.0 - 2.0 mmol/L Final      Arterial Blood Gas:   pH, Cord Arterial   Date Value Ref Range Status   10/05/2023 7.31 7.21 - 7.31 pH Units Final     Base Exc, Cord Arterial   Date Value Ref Range Status   10/05/2023 -1.9 -2.0 - 2.0 mmol/L Final        Hospital Course     Hospital Course:  Mercedes Hanson is a 28 y.o.  39w0d who presented on 10/5/2023 for a scheduled repeat  delivery at term.  For full details of that procedure please see the separate dictated delivery summary.  After initial recovery in the PACU the patient was transferred to the postpartum unit for further postpartum care.  Her Luna catheter was discontinued on the afternoon of surgery.  Her IV  fluids were then saline locked.  By postop day 1 her labs were appropriate.  On postop day 2, meeting all postop milestones.  Pain is controlled.  Ambulating and voiding without difficulties.  Lochia is minimal.  Tolerating regular diet without nausea or vomiting.  Stable for discharge home.    Day of Discharge     Vital Signs:  Temp:  [98 °F (36.7 °C)-98.2 °F (36.8 °C)] 98.2 °F (36.8 °C)  Heart Rate:  [77-86] 86  Resp:  [15-18] 16  BP: (126-134)/(77-81) 126/81    Pertinent  and/or Most Recent Results     LAB RESULTS:       Lab 10/06/23  0523 10/05/23  0805   WBC 10.29 9.03   HEMOGLOBIN 9.5* 10.0*   HEMATOCRIT 32.2* 31.5*   PLATELETS 204 196   NEUTROS ABS 7.67* 6.84   IMMATURE GRANS (ABS) 0.04 0.04   LYMPHS ABS 1.81 1.58   MONOS ABS 0.66 0.52   EOS ABS 0.08 0.03   MCV 81.7 78.6*                 Lab 10/05/23  0805   ABO TYPING B   RH TYPING Positive   ANTIBODY SCREEN Negative     URINALYSIS@  Microbiology Results (last 10 days)       ** No results found for the last 240 hours. **               Discharge Details        Discharge Medications        New Medications        Instructions Start Date   docusate sodium 100 MG capsule  Commonly known as: Colace   100 mg, Oral, 2 Times Daily      ibuprofen 600 MG tablet  Commonly known as: ADVIL,MOTRIN   600 mg, Oral, Every 6 Hours PRN      naloxone 4 MG/0.1ML nasal spray  Commonly known as: NARCAN   Call 911. Don't prime. [place] in 1 nostril for overdose. Repeat in 2-3 minutes in other nostril if no or minimal breathing/responsiveness.      oxyCODONE-acetaminophen 5-325 MG per tablet  Commonly known as: Percocet   1-2 tablets, Oral, Every 6 Hours PRN             Continue These Medications        Instructions Start Date   PRENATAL VIT-FE FUMARATE-FA PO   Oral               Allergies   Allergen Reactions    Nickel Rash     contact derm       Discharge Disposition:   Home, self-care    Discharge Condition:  Good    Diet:   Regular    Discharge Activity:   Pelvic rest for 6 weeks,  no intercourse for 6 weeks, no tampons or douching for 6 weeks, nothing in the vagina for 6 weeks  No driving for 2 weeks  No lifting more than 15 to 20 pounds for 2 weeks  No tub baths for 2 weeks, but may shower  Keep the incision clean and dry    Call the office or go to the emergency department for temperature greater than 100 °F, shortness of breath or chest pain, excessive nausea or vomiting, pain that is worsening despite current pain medications, redness, swelling, or drainage from the incision site, vaginal bleeding soaking a pad in less than 1 hour.    Follow Up:  Future Appointments   Date Time Provider Department Center   11/1/2023 10:10 AM Tyson Vidal MD [facility] [place] [facility]       Electronically signed by Tyson Vidal MD, 10/05/23, 4:37 PM EDT.

## 2023-10-05 NOTE — H&P
WILIAM Syed  Obstetric History and Physical    Chief Complaint:  Scheduled CS    Subjective:  The patient is a 28 y.o.  currently at 39w0d, who presents for a scheduled repeat  delivery at term.    Her prenatal care is complicated by: Prior , LGA    The following portions of the patients history were reviewed and updated as appropriate: current medications, allergies, past medical history, past surgical history, past family history, past social history, and problem list .     Prenatal Information:  Prenatal Results       Initial Prenatal Labs       Test Value Reference Range Date Time    Hemoglobin  14.4 g/dL 12.0 - 15.9 23 1039    Hematocrit  43.5 % 34.0 - 46.6 23 1039    Platelets  196 10*3/mm3 140 - 450 23 1039    Rubella IgG  5.54 index Immune >0.99 23 1039    Hepatitis B SAg  Non-Reactive  Non-Reactive 23 1039    Hepatitis C Ab  Non-Reactive  Non-Reactive 23 1039    RPR  NON-REACTIVE NA  20 1552    T. Pallidum Ab   Non-Reactive  Non-Reactive 23 1039    ABO  B   23 1039    Rh  Positive   23 1039    Antibody Screen  Negative   23 1039    HIV  Non-Reactive  Non-Reactive 23 1039    Urine Culture  No growth   23 1056    Gonorrhea  Negative  Negative 23 1039    Chlamydia  Negative  Negative 23 1039    TSH  2.300 m[iU]/L 0.270 - 4.200 20 1046    HgB A1c         Varicella IgG        HgB Electrophoresis         Cystic fibrosis                   Fetal testing        Test Value Reference Range Date Time    NIPT        MSAFP        AFP-4                  2nd and 3rd Trimester       Test Value Reference Range Date Time    Hemoglobin (repeated)  11.9 g/dL 12.0 - 15.9 23 0948    Hematocrit (repeated)  35.8 % 34.0 - 46.6 23 0948    Platelets   213 10*3/mm3 140 - 450 23 0948       196 10*3/mm3 140 - 450 23 1039    GCT  148 mg/dL 74 - 180 23 0919       139 mg/dL 65 - 139 23  0948    Antibody Screen (repeated)  Negative   04/17/23 1039    GTT Fasting        GTT 1 Hr        GTT 2 Hr        GTT 3 Hr  95 mg/dL 74 - 140 07/05/23 1122    Group B Strep  Positive  Negative 09/20/23 1049              Other testing        Test Value Reference Range Date Time    Parvo IgG         CMV IgG                   Drug Screening       Test Value Reference Range Date Time    Amphetamine Screen        Barbiturate Screen        Benzodiazepine Screen        Methadone Screen        Phencyclidine Screen        Opiates Screen        THC Screen        Cocaine Screen        Propoxyphene Screen        Buprenorphine Screen        Methamphetamine Screen        Oxycodone Screen        Tricyclic Antidepressants Screen                  Legend    ^: Historical                          External Prenatal Results       Pregnancy Outside Results - Transcribed From Office Records - See Scanned Records For Details       Test Value Date Time    ABO  B  04/17/23 1039    Rh  Positive  04/17/23 1039    Antibody Screen  Negative  04/17/23 1039    Varicella IgG       Rubella  5.54 index 04/17/23 1039    Hgb  11.9 g/dL 06/27/23 0948       14.4 g/dL 04/17/23 1039    Hct  35.8 % 06/27/23 0948       43.5 % 04/17/23 1039    Glucose Fasting GTT       Glucose Tolerance Test 1 hour       Glucose Tolerance Test 3 hour  95 mg/dL 07/05/23 1122    Gonorrhea (discrete)  Negative  04/17/23 1039    Chlamydia (discrete)  Negative  04/17/23 1039    RPR  NON-REACTIVE NA 06/23/20 1552    VDRL       Syphilis Antibody       HBsAg  Non-Reactive  04/17/23 1039    Herpes Simplex Virus PCR       Herpes Simplex VIrus Culture       HIV  Non-Reactive  04/17/23 1039    Hep C RNA Quant PCR       Hep C Antibody  Non-Reactive  04/17/23 1039    AFP       Group B Strep  Positive  09/20/23 1049    GBS Susceptibility to Clindamycin       GBS Susceptibility to Erythromycin       Fetal Fibronectin       Genetic Testing, Maternal Blood                 Drug Screening        Test Value Date Time    Urine Drug Screen       Amphetamine Screen       Barbiturate Screen       Benzodiazepine Screen       Methadone Screen       Phencyclidine Screen       Opiates Screen       THC Screen       Cocaine Screen       Propoxyphene Screen       Buprenorphine Screen       Methamphetamine Screen       Oxycodone Screen       Tricyclic Antidepressants Screen                 Legend    ^: Historical                            Past OB History:  OB History    Para Term  AB Living   2 1 1 0 0 1   SAB IAB Ectopic Molar Multiple Live Births   0 0 0 0 0 1      # Outcome Date GA Lbr Eric/2nd Weight Sex Delivery Anes PTL Lv   2 Current            1 Term  40w0d  3118 g (6 lb 14 oz) M CS-LTranv   ALEJANDRO      Complications: Breech presentation       Past Medical History:  Past Medical History:   Diagnosis Date    Abnormal Pap smear of cervix        Past Surgical History:  Past Surgical History:   Procedure Laterality Date     SECTION      TONSILLECTOMY         Family History:  No family history on file.    Social History:   reports that she has never smoked. She has never used smokeless tobacco.   reports no history of alcohol use.   reports no history of drug use.    Medications:  Prenatal Vit-Fe Fumarate-FA    Allergies:  Allergies   Allergen Reactions    Nickel Rash     contact derm       Review of Systems:  No leaking fluid, No vaginal bleeding, Is feeling adequate FM, No HA, No scotomata or vision changes, No RUQ/epigastric pain, No fever/chills, No nausea, No vomiting, No diarrhea, No constipation, No abdominal pain, Contractions, Swelling, and Back pain    Objective     Vital Signs:  BP: (136)/(81) 136/81     Physical Exam:    General:  alert, well appearing, in no apparent distress  HEENT: PERRLA, extra ocular movement intact, sclera clear, anicteric, and neck supple with midline trachea  Cardiovascular: normal rate, regular rhythm,  no murmurs, rubs, or gallops  Lungs: clear to  auscultation, no wheezes, rales or rhonchi, symmetric air entry  Abdomen: soft, nontender, nondistended, no abnormal masses, no epigastric pain, fundus soft, nontender 40 weeks size, and estimated fetal weight: 8.5 lbs  Extremities: 1+ edema, no redness or tenderness in the calves or thighs 2+ bilaterally      Fetal Assessment:    FHR:   Baseline: 130  Variability: Moderate  Accelerations: Present (32 weeks+) 15 x 15 bpm  Decelerations: Absent   Category: Category 1    Contractions: Irregular    Fetal Presentation: cephalic    Labs:   Lab Results (last 24 hours)       Procedure Component Value Units Date/Time    CBC & Differential [[id number]]  (Abnormal) Collected: 10/05/23 0805    Specimen: Blood Updated: 10/05/23 0816    Narrative:      The following orders were created for panel order CBC & Differential.  Procedure                               Abnormality         Status                     ---------                               -----------         ------                     CBC Auto Differential[[id number]]        Abnormal            Final result                 Please view results for these tests on the individual orders.    CBC Auto Differential [[id number]]  (Abnormal) Collected: 10/05/23 0805    Specimen: Blood Updated: 10/05/23 0816     WBC 9.03 10*3/mm3      RBC 4.01 10*6/mm3      Hemoglobin 10.0 g/dL      Hematocrit 31.5 %      MCV 78.6 fL      MCH 24.9 pg      MCHC 31.7 g/dL      RDW 13.2 %      RDW-SD 37.7 fl      MPV 11.5 fL      Platelets 196 10*3/mm3      Neutrophil % 75.8 %      Lymphocyte % 17.5 %      Monocyte % 5.8 %      Eosinophil % 0.3 %      Basophil % 0.2 %      Immature Grans % 0.4 %      Neutrophils, Absolute 6.84 10*3/mm3      Lymphocytes, Absolute 1.58 10*3/mm3      Monocytes, Absolute 0.52 10*3/mm3      Eosinophils, Absolute 0.03 10*3/mm3      Basophils, Absolute 0.02 10*3/mm3      Immature Grans, Absolute 0.04 10*3/mm3      nRBC 0.0 /100 WBC     Blood Gas, Arterial, Cord [[id number]]   (Abnormal) Collected: 10/05/23 0904    Specimen: Cord Blood Arterial from Umbilical Cord Updated: 10/05/23 0948     pH, Cord Arterial 7.31 pH Units      pCO2, Cord Arterial 50.5 mmHg      Base Exc, Cord Arterial -1.9 mmol/L      pO2, Cord Arterial 17.4 mmHg      HCO3, Cord Arterial 25.0 mmol/L     Blood Gas, Venous, Cord [[id number]]  (Abnormal) Collected: 10/05/23 0904    Specimen: Cord Blood Venous from Umbilical Cord Updated: 10/05/23 0951     pH, Cord Venous 7.356 pH Units      pCO2, Cord Venous 38.8 mm Hg      pO2, Cord Venous 25.4 mm Hg      Base Excess, Cord Venous -3.8 mmol/L      HCO3, Cord Venous 21.2 mmol/L            Assessment:  28 y.o.  currently at 39w0d    Previous  delivery affecting pregnancy    Supervision of other normal pregnancy, antepartum    Fetal size inconsistent with dates    GBS: Negative    Plan:  The patient desires repeat  delivery  We have reviewed the risks, benefits, and alternatives of the procedure including the risk of: bleeding, infection, hemorrhage, blood transfusion, risk of injury to nearby structures including: bowl, bladder, pelvic blood vessels and nerves, risk of injury to the baby, risk of anesthesia, venous thromboembolism, myocardial infarction, stroke, and death. We have also discussed the risks of repetitive  deliveries including the risk of abnormal placentation such as placenta accreta. The patient expresses her understanding of these risks and wishes to proceed.  Plan of care has been reviewed with patient  Risks, benefits of treatment plan have been discussed.  All questions have been answered.    Tyson Vidal MD  10/5/2023  09:59 EDT

## 2023-10-05 NOTE — ANESTHESIA PROCEDURE NOTES
Spinal Block      Start Time: 10/5/2023 9:03 AM  Stop Time: 10/5/2023 9:07 AM  Indication:at surgeon's request, post-op pain management and procedure for pain  Performed By  Anesthesiologist: Palmer Garcia MD  CRNA/CAA: Everardo Kraft CRNA  Preanesthetic Checklist  Completed: patient identified, IV checked, site marked, risks and benefits discussed, surgical consent, monitors and equipment checked, pre-op evaluation and timeout performed  Spinal Block Prep:  Patient Position:sitting  Sterile Tech:cap, gloves, gown, mask and sterile barriers  Prep:Chloraprep  Patient Monitoring:blood pressure monitoring, continuous pulse oximetry and EKG    Spinal Block Procedure  Approach:midline  Guidance:landmark technique and palpation technique  Location:L3-L4  Needle Type:Pencan  Needle Gauge:25 G  Placement of Spinal needle event:cerebrospinal fluid aspirated  Paresthesia: no  Fluid Appearance:clear  Medications: morphine PF (DURAMORPH) injection - Intrathecal   0.1 mg - 10/5/2023 9:07:00 AM  fentaNYL (SUBLIMAZE) injection - Intrathecal   15 mcg - 10/5/2023 9:07:00 AM  bupivacaine PF (MARCAINE) injection 0.75% - Intrathecal   1.5 mL - 10/5/2023 9:07:00 AM   Post Assessment  Patient Tolerance:patient tolerated the procedure well with no apparent complications  Complications no

## 2023-10-06 LAB
BASOPHILS # BLD AUTO: 0.03 10*3/MM3 (ref 0–0.2)
BASOPHILS NFR BLD AUTO: 0.3 % (ref 0–1.5)
DEPRECATED RDW RBC AUTO: 39.6 FL (ref 37–54)
EOSINOPHIL # BLD AUTO: 0.08 10*3/MM3 (ref 0–0.4)
EOSINOPHIL NFR BLD AUTO: 0.8 % (ref 0.3–6.2)
ERYTHROCYTE [DISTWIDTH] IN BLOOD BY AUTOMATED COUNT: 13.3 % (ref 12.3–15.4)
HCT VFR BLD AUTO: 32.2 % (ref 34–46.6)
HGB BLD-MCNC: 9.5 G/DL (ref 12–15.9)
IMM GRANULOCYTES # BLD AUTO: 0.04 10*3/MM3 (ref 0–0.05)
IMM GRANULOCYTES NFR BLD AUTO: 0.4 % (ref 0–0.5)
LYMPHOCYTES # BLD AUTO: 1.81 10*3/MM3 (ref 0.7–3.1)
LYMPHOCYTES NFR BLD AUTO: 17.6 % (ref 19.6–45.3)
MCH RBC QN AUTO: 24.1 PG (ref 26.6–33)
MCHC RBC AUTO-ENTMCNC: 29.5 G/DL (ref 31.5–35.7)
MCV RBC AUTO: 81.7 FL (ref 79–97)
MONOCYTES # BLD AUTO: 0.66 10*3/MM3 (ref 0.1–0.9)
MONOCYTES NFR BLD AUTO: 6.4 % (ref 5–12)
NEUTROPHILS NFR BLD AUTO: 7.67 10*3/MM3 (ref 1.7–7)
NEUTROPHILS NFR BLD AUTO: 74.5 % (ref 42.7–76)
NRBC BLD AUTO-RTO: 0 /100 WBC (ref 0–0.2)
PLATELET # BLD AUTO: 204 10*3/MM3 (ref 140–450)
PMV BLD AUTO: 11.7 FL (ref 6–12)
RBC # BLD AUTO: 3.94 10*6/MM3 (ref 3.77–5.28)
WBC NRBC COR # BLD: 10.29 10*3/MM3 (ref 3.4–10.8)

## 2023-10-06 PROCEDURE — 85025 COMPLETE CBC W/AUTO DIFF WBC: CPT | Performed by: OBSTETRICS & GYNECOLOGY

## 2023-10-06 PROCEDURE — 25010000002 KETOROLAC TROMETHAMINE PER 15 MG: Performed by: OBSTETRICS & GYNECOLOGY

## 2023-10-06 RX ADMIN — DOCUSATE SODIUM 100 MG: 100 CAPSULE, LIQUID FILLED ORAL at 20:42

## 2023-10-06 RX ADMIN — DOCUSATE SODIUM 100 MG: 100 CAPSULE, LIQUID FILLED ORAL at 08:54

## 2023-10-06 RX ADMIN — KETOROLAC TROMETHAMINE 15 MG: 15 INJECTION, SOLUTION INTRAMUSCULAR; INTRAVENOUS at 06:21

## 2023-10-06 RX ADMIN — IBUPROFEN 600 MG: 600 TABLET, FILM COATED ORAL at 23:15

## 2023-10-06 RX ADMIN — KETOROLAC TROMETHAMINE 15 MG: 15 INJECTION, SOLUTION INTRAMUSCULAR; INTRAVENOUS at 13:00

## 2023-10-06 RX ADMIN — ACETAMINOPHEN 650 MG: 325 TABLET ORAL at 14:58

## 2023-10-06 RX ADMIN — ALUMINUM HYDROXIDE, MAGNESIUM HYDROXIDE, AND DIMETHICONE 15 ML: 400; 400; 40 SUSPENSION ORAL at 18:22

## 2023-10-06 RX ADMIN — KETOROLAC TROMETHAMINE 15 MG: 15 INJECTION, SOLUTION INTRAMUSCULAR; INTRAVENOUS at 00:08

## 2023-10-06 RX ADMIN — ACETAMINOPHEN 1000 MG: 500 TABLET ORAL at 02:53

## 2023-10-06 RX ADMIN — IBUPROFEN 600 MG: 600 TABLET, FILM COATED ORAL at 17:44

## 2023-10-06 RX ADMIN — ACETAMINOPHEN 650 MG: 325 TABLET ORAL at 20:42

## 2023-10-06 RX ADMIN — ACETAMINOPHEN 1000 MG: 500 TABLET ORAL at 08:54

## 2023-10-06 NOTE — PLAN OF CARE
Problem: Adult Inpatient Plan of Care  Goal: Plan of Care Review  Outcome: Ongoing, Progressing  Goal: Patient-Specific Goal (Individualized)  Outcome: Ongoing, Progressing  Goal: Absence of Hospital-Acquired Illness or Injury  Outcome: Ongoing, Progressing  Intervention: Identify and Manage Fall Risk  Recent Flowsheet Documentation  Taken 10/5/2023 2145 by Mirna Reyes, RN  Safety Promotion/Fall Prevention: safety round/check completed  Intervention: Prevent and Manage VTE (Venous Thromboembolism) Risk  Recent Flowsheet Documentation  Taken 10/5/2023 2145 by Mirna Reyes, RN  VTE Prevention/Management:   sequential compression devices on   bilateral  Taken 10/5/2023 1930 by Mirna Reyes, RN  Activity Management: ambulated to bathroom  Goal: Optimal Comfort and Wellbeing  Outcome: Ongoing, Progressing  Goal: Readiness for Transition of Care  Outcome: Ongoing, Progressing     Problem: Adjustment to Role Transition (Postpartum  Delivery)  Goal: Successful Maternal Role Transition  Outcome: Ongoing, Progressing     Problem: Bleeding (Postpartum  Delivery)  Goal: Hemostasis  Outcome: Ongoing, Progressing     Problem: Infection (Postpartum  Delivery)  Goal: Absence of Infection Signs and Symptoms  Outcome: Ongoing, Progressing     Problem: Pain (Postpartum  Delivery)  Goal: Acceptable Pain Control  Outcome: Ongoing, Progressing     Problem: Postoperative Nausea and Vomiting (Postpartum  Delivery)  Goal: Nausea and Vomiting Relief  Outcome: Ongoing, Progressing     Problem: Postoperative Urinary Retention (Postpartum  Delivery)  Goal: Effective Urinary Elimination  Outcome: Ongoing, Progressing   Goal Outcome Evaluation:

## 2023-10-06 NOTE — LACTATION NOTE
LC noted that baby has a very tight tongue frenulum but is not causing pain and good swallows seen at this feeding. Patient states pedi has observed this as well. She continues to breastfeed exclusively and infant's weight loss and output are wdl.

## 2023-10-06 NOTE — ANESTHESIA POSTPROCEDURE EVALUATION
Patient: April Ashley Hanson    Procedure Summary       Date: 10/05/23 Room / Location: [facility] LABOR DELIVERY  [facility] LABOR DELIVERY    Anesthesia Start: 0900 Anesthesia Stop: 100    Procedure:  SECTION REPEAT (Abdomen) Diagnosis:     Surgeons: Tyson Vidal MD Provider: Palmer Garcia MD    Anesthesia Type: spinal ASA Status: 3            Anesthesia Type: spinal    Vitals  Vitals Value Taken Time   /71 10/06/23 0254   Temp 36.6 °C (97.8 °F) 10/06/23 0254   Pulse 64 10/06/23 0254   Resp 16 10/06/23 0254   SpO2 97 % 10/06/23 0254           Post Anesthesia Care and Evaluation    Patient location during evaluation: bedside  Patient participation: complete - patient participated  Level of consciousness: awake  Pain management: adequate    Airway patency: patent  PONV Status: controlled  Cardiovascular status: acceptable and stable  Respiratory status: acceptable  Hydration status: acceptable  Post Neuraxial Block status: Motor and sensory function returned to baseline and No signs or symptoms of PDPH

## 2023-10-06 NOTE — PLAN OF CARE
Problem: Adult Inpatient Plan of Care  Goal: Plan of Care Review  Outcome: Ongoing, Progressing  Goal: Patient-Specific Goal (Individualized)  Outcome: Ongoing, Progressing  Goal: Absence of Hospital-Acquired Illness or Injury  Outcome: Ongoing, Progressing  Intervention: Identify and Manage Fall Risk  Recent Flowsheet Documentation  Taken 10/6/2023 1600 by Jocelyne Brunner, RN  Safety Promotion/Fall Prevention: safety round/check completed  Taken 10/6/2023 1500 by Jocelyne Brunner, RN  Safety Promotion/Fall Prevention: safety round/check completed  Taken 10/6/2023 1400 by Jocelyne Brunner, RN  Safety Promotion/Fall Prevention: safety round/check completed  Taken 10/6/2023 1300 by Jocelyne Brunner, RN  Safety Promotion/Fall Prevention: safety round/check completed  Goal: Optimal Comfort and Wellbeing  Outcome: Ongoing, Progressing  Goal: Readiness for Transition of Care  Outcome: Ongoing, Progressing     Problem: Adjustment to Role Transition (Postpartum  Delivery)  Goal: Successful Maternal Role Transition  Outcome: Ongoing, Progressing   Goal Outcome Evaluation:

## 2023-10-06 NOTE — PROGRESS NOTES
"WILIAM Syed   PROGRESS NOTE    Post-Op Day 1 S/P     Subjective:  Patient has no complaints  Pain controlled  Tolerating a regular diet  Passing flatus  Ambulating  Urinating spontaneously  Lochia decreasing, no bleeding concerns  Denies HA, vision change, or RUQ/epigastric pain  No lightheadedness or dizziness    Objective    Objective:  Vital signs (most recent): Blood pressure 119/71, pulse 64, temperature 97.8 °F (36.6 °C), temperature source Oral, resp. rate 16, height 157.5 cm (62\"), weight 84.8 kg (187 lb), last menstrual period 2023, SpO2 97 %, currently breastfeeding.     Temp:  [97.7 °F (36.5 °C)-98.9 °F (37.2 °C)] 97.8 °F (36.6 °C)  Heart Rate:  [64-99] 64  Resp:  [16-21] 16  BP: (102-127)/(63-89) 119/71     Physical Exam:  GEN: alert and in no distress  Abdomen: fundus firm - below the umbilicus  abdomen soft + BS's  Bandages clean and dry  Incision: dressed  Extremi1+ edema, no redness or tenderness in the calves or thighsties:     Labs:  Lab Results (last 24 hours)       Procedure Component Value Units Date/Time    Blood Gas, Arterial, Cord [[id number]]  (Abnormal) Collected: 10/05/23 0904    Specimen: Cord Blood Arterial from Umbilical Cord Updated: 10/05/23 0948     pH, Cord Arterial 7.31 pH Units      pCO2, Cord Arterial 50.5 mmHg      Base Exc, Cord Arterial -1.9 mmol/L      pO2, Cord Arterial 17.4 mmHg      HCO3, Cord Arterial 25.0 mmol/L     Blood Gas, Venous, Cord [[id number]]  (Abnormal) Collected: 10/05/23 0904    Specimen: Cord Blood Venous from Umbilical Cord Updated: 10/05/23 0951     pH, Cord Venous 7.356 pH Units      pCO2, Cord Venous 38.8 mm Hg      pO2, Cord Venous 25.4 mm Hg      Base Excess, Cord Venous -3.8 mmol/L      HCO3, Cord Venous 21.2 mmol/L     CBC & Differential [[id number]]  (Abnormal) Collected: 10/06/23 0523    Specimen: Blood Updated: 10/06/23 0549    Narrative:      The following orders were created for panel order CBC & Differential.  Procedure       "                         Abnormality         Status                     ---------                               -----------         ------                     CBC Auto Differential[[id number]]        Abnormal            Final result                 Please view results for these tests on the individual orders.    CBC Auto Differential [[id number]]  (Abnormal) Collected: 10/06/23 0523    Specimen: Blood Updated: 10/06/23 0549     WBC 10.29 10*3/mm3      RBC 3.94 10*6/mm3      Hemoglobin 9.5 g/dL      Hematocrit 32.2 %      MCV 81.7 fL      MCH 24.1 pg      MCHC 29.5 g/dL      RDW 13.3 %      RDW-SD 39.6 fl      MPV 11.7 fL      Platelets 204 10*3/mm3      Neutrophil % 74.5 %      Lymphocyte % 17.6 %      Monocyte % 6.4 %      Eosinophil % 0.8 %      Basophil % 0.3 %      Immature Grans % 0.4 %      Neutrophils, Absolute 7.67 10*3/mm3      Lymphocytes, Absolute 1.81 10*3/mm3      Monocytes, Absolute 0.66 10*3/mm3      Eosinophils, Absolute 0.08 10*3/mm3      Basophils, Absolute 0.03 10*3/mm3      Immature Grans, Absolute 0.04 10*3/mm3      nRBC 0.0 /100 WBC                Assessment & Plan        Previous  delivery affecting pregnancy    Fetal size inconsistent with dates     delivery delivered    Assessment & Plan    Assessment:    April Ashley Hanson is Day 1  post-partum    , Low Transverse         .      Plan:    Routine postpartum/postop care    Ambulate, Remove IV, Remove bandage, Shower, Sitz baths, PO pain meds, Importance of wound care/keep clean and dry        Tyson Vidal MD  10/06/23  08:46 EDT

## 2023-10-07 VITALS
WEIGHT: 187 LBS | DIASTOLIC BLOOD PRESSURE: 75 MMHG | HEIGHT: 62 IN | TEMPERATURE: 98.6 F | SYSTOLIC BLOOD PRESSURE: 124 MMHG | BODY MASS INDEX: 34.41 KG/M2 | OXYGEN SATURATION: 97 % | HEART RATE: 82 BPM | RESPIRATION RATE: 16 BRPM

## 2023-10-07 RX ADMIN — ACETAMINOPHEN 650 MG: 325 TABLET ORAL at 08:09

## 2023-10-07 RX ADMIN — DOCUSATE SODIUM 100 MG: 100 CAPSULE, LIQUID FILLED ORAL at 08:09

## 2023-10-07 RX ADMIN — ACETAMINOPHEN 650 MG: 325 TABLET ORAL at 03:38

## 2023-10-07 RX ADMIN — IBUPROFEN 600 MG: 600 TABLET, FILM COATED ORAL at 06:02

## 2023-10-07 NOTE — PLAN OF CARE
Problem: Adult Inpatient Plan of Care  Goal: Plan of Care Review  Outcome: Ongoing, Progressing  Flowsheets (Taken 10/7/2023 0527)  Progress: improving  Plan of Care Reviewed With: patient  Outcome Evaluation: Ongoing progress toward goal.  Goal: Patient-Specific Goal (Individualized)  Outcome: Ongoing, Progressing  Goal: Absence of Hospital-Acquired Illness or Injury  Outcome: Ongoing, Progressing  Intervention: Identify and Manage Fall Risk  Recent Flowsheet Documentation  Taken 10/7/2023 0400 by Kimberly Rocha RN  Safety Promotion/Fall Prevention: safety round/check completed  Taken 10/7/2023 0200 by Kimberly Rocha RN  Safety Promotion/Fall Prevention: safety round/check completed  Taken 10/7/2023 001 by Kimberly Rocha RN  Safety Promotion/Fall Prevention:   safety round/check completed   clutter free environment maintained   lighting adjusted   nonskid shoes/slippers when out of bed   room organization consistent  Intervention: Prevent Skin Injury  Recent Flowsheet Documentation  Taken 10/7/2023 0010 by Kimberly Rocha RN  Body Position: position changed independently  Intervention: Prevent and Manage VTE (Venous Thromboembolism) Risk  Recent Flowsheet Documentation  Taken 10/7/2023 001 by Kimberly Rocha RN  Activity Management: up ad carina  Goal: Optimal Comfort and Wellbeing  Outcome: Ongoing, Progressing  Intervention: Provide Person-Centered Care  Recent Flowsheet Documentation  Taken 10/7/2023 0010 by Kimberly Rocha RN  Trust Relationship/Rapport:   care explained   choices provided   emotional support provided   empathic listening provided   questions answered   questions encouraged   reassurance provided   thoughts/feelings acknowledged  Goal: Readiness for Transition of Care  Outcome: Ongoing, Progressing     Problem: Change in Fetal Wellbeing ( Delivery)  Goal: Stable Fetal Wellbeing  Intervention: Promote and Monitor Fetal Wellbeing  Recent Flowsheet Documentation  Taken 10/7/2023 0010 by  Kimberly Rocha, RN  Body Position: position changed independently     Problem: Adjustment to Role Transition (Postpartum  Delivery)  Goal: Successful Maternal Role Transition  Outcome: Ongoing, Progressing     Problem: Bleeding (Postpartum  Delivery)  Goal: Hemostasis  Outcome: Ongoing, Progressing     Problem: Infection (Postpartum  Delivery)  Goal: Absence of Infection Signs and Symptoms  Outcome: Ongoing, Progressing     Problem: Pain (Postpartum  Delivery)  Goal: Acceptable Pain Control  Outcome: Ongoing, Progressing     Problem: Postoperative Nausea and Vomiting (Postpartum  Delivery)  Goal: Nausea and Vomiting Relief  Outcome: Ongoing, Progressing     Problem: Postoperative Urinary Retention (Postpartum  Delivery)  Goal: Effective Urinary Elimination  Outcome: Ongoing, Progressing   Goal Outcome Evaluation:  Plan of Care Reviewed With: patient        Progress: improving  Outcome Evaluation: Ongoing progress toward goal.

## 2023-10-07 NOTE — PLAN OF CARE
Problem: Adult Inpatient Plan of Care  Goal: Plan of Care Review  Outcome: Met  Goal: Patient-Specific Goal (Individualized)  Outcome: Met  Goal: Absence of Hospital-Acquired Illness or Injury  Outcome: Met  Intervention: Identify and Manage Fall Risk  Description: Perform standard risk assessment on admission using a validated tool or comprehensive approach appropriate to the patient; reassess fall risk frequently, with change in status or transfer to another level of care.  Communicate fall injury risk to interprofessional healthcare team.  Determine need for increased observation, equipment and environmental modification, such as low bed, signage and supportive, nonskid footwear.  Adjust safety measures to individual developmental age, stage and identified risk factors.  Reinforce the importance of safety and physical activity with patient and family.  Perform regular intentional rounding to assess need for position change, pain assessment and personal needs, including assistance with toileting.  Recent Flowsheet Documentation  Taken 10/7/2023 0805 by Polina Flores, RN  Safety Promotion/Fall Prevention: safety round/check completed  Intervention: Prevent and Manage VTE (Venous Thromboembolism) Risk  Description: Assess for VTE (venous thromboembolism) risk.  Encourage and assist with early ambulation.  Initiate and maintain compression or other therapy, as indicated, based on identified risk in accordance with organizational protocol and provider order.  Encourage both active and passive leg exercises while in bed, if unable to ambulate.  Recent Flowsheet Documentation  Taken 10/7/2023 0805 by Polina Flores, RN  Activity Management: up ad carina  Goal: Optimal Comfort and Wellbeing  Outcome: Met  Intervention: Provide Person-Centered Care  Description: Use a family-focused approach to care.  Develop trust and rapport by proactively providing information, encouraging questions, addressing concerns and offering  reassurance.  Acknowledge emotional response to hospitalization.  Recognize and utilize personal coping strategies.  Honor spiritual and cultural preferences.  Recent Flowsheet Documentation  Taken 10/7/2023 0805 by Polina Flores RN  Trust Relationship/Rapport:   care explained   choices provided   emotional support provided   empathic listening provided   questions answered   questions encouraged   reassurance provided   thoughts/feelings acknowledged  Goal: Readiness for Transition of Care  Outcome: Met     Problem: Adjustment to Role Transition (Postpartum  Delivery)  Goal: Successful Maternal Role Transition  Outcome: Met     Problem: Bleeding (Postpartum  Delivery)  Goal: Hemostasis  Outcome: Met     Problem: Infection (Postpartum  Delivery)  Goal: Absence of Infection Signs and Symptoms  Outcome: Met     Problem: Pain (Postpartum  Delivery)  Goal: Acceptable Pain Control  Outcome: Met     Problem: Postoperative Nausea and Vomiting (Postpartum  Delivery)  Goal: Nausea and Vomiting Relief  Outcome: Met     Problem: Postoperative Urinary Retention (Postpartum  Delivery)  Goal: Effective Urinary Elimination  Outcome: Met   Goal Outcome Evaluation:

## 2023-10-07 NOTE — LACTATION NOTE
Pt states her milk is coming in, nipples sore with initial latching. Baby latched to right breast in cradle position. D/C instructions gone over, included hand hygiene, respiratory hygiene and breastfeeding when mom is sick, LC encouraged pt to see pediatrician within two days of discharge for follow up.  LC discussed  breastfeeding behaviors, first two weeks of breastfeeding expectations, encouraged her to breastfeed/pump frequently for good milk supply. LC discussed nipple care, plugged ducts, engorgement, and breast infection. LC informed pt that LC was available after D/C for assistance with breastfeeding.

## 2023-10-07 NOTE — PROGRESS NOTES
Postpartum  Section       POD#2    April Ashley Hanson is a 28 y.o.  who underwent a  sectionRLTCS  She complains of appropriate pain per palpation that is controlled by pain meds. Patient is urinating appropriately.  Patient is eating without any N/V. Patient states lochia is light. Patient is ambulating without any difficulty. Patient is breastfeed      I/O last 3 completed shifts:  In: -   Out: 950 [Urine:950]  No intake/output data recorded.    Physical Exam:  HEENT:Normocephalic and atraumatic, Thyroid WNL,   Lungs: CTA B/L, negative W/W/R   Abdomen: Soft, appropriate tenderness to palpation, Uterine fundus firm and below the umbilicus  Wound:  Clean, Dry and Intact, negative drainage   Extremities: Trace swelling, negative cyanosis, negative clonus    Recent Results (from the past 48 hour(s))   Blood Gas, Arterial, Cord    Collection Time: 10/05/23  9:04 AM    Specimen: Umbilical Cord; Cord Blood Arterial   Result Value Ref Range    pH, Cord Arterial 7.31 7.21 - 7.31 pH Units    pCO2, Cord Arterial 50.5 (H) 33.0 - 49.0 mmHg    Base Exc, Cord Arterial -1.9 -2.0 - 2.0 mmol/L    pO2, Cord Arterial 17.4 mmHg    HCO3, Cord Arterial 25.0 mmol/L   Blood Gas, Venous, Cord    Collection Time: 10/05/23  9:04 AM    Specimen: Umbilical Cord; Cord Blood Venous   Result Value Ref Range    pH, Cord Venous 7.356 7.310 - 7.370 pH Units    pCO2, Cord Venous 38.8 28.0 - 40.0 mm Hg    pO2, Cord Venous 25.4 21.0 - 31.0 mm Hg    Base Excess, Cord Venous -3.8 (L) -2.0 - 2.0 mmol/L    HCO3, Cord Venous 21.2 mmol/L   CBC Auto Differential    Collection Time: 10/06/23  5:23 AM    Specimen: Blood   Result Value Ref Range    WBC 10.29 3.40 - 10.80 10*3/mm3    RBC 3.94 3.77 - 5.28 10*6/mm3    Hemoglobin 9.5 (L) 12.0 - 15.9 g/dL    Hematocrit 32.2 (L) 34.0 - 46.6 %    MCV 81.7 79.0 - 97.0 fL    MCH 24.1 (L) 26.6 - 33.0 pg    MCHC 29.5 (L) 31.5 - 35.7 g/dL    RDW 13.3 12.3 - 15.4 %    RDW-SD 39.6 37.0 - 54.0 fl    MPV  11.7 6.0 - 12.0 fL    Platelets 204 140 - 450 10*3/mm3    Neutrophil % 74.5 42.7 - 76.0 %    Lymphocyte % 17.6 (L) 19.6 - 45.3 %    Monocyte % 6.4 5.0 - 12.0 %    Eosinophil % 0.8 0.3 - 6.2 %    Basophil % 0.3 0.0 - 1.5 %    Immature Grans % 0.4 0.0 - 0.5 %    Neutrophils, Absolute 7.67 (H) 1.70 - 7.00 10*3/mm3    Lymphocytes, Absolute 1.81 0.70 - 3.10 10*3/mm3    Monocytes, Absolute 0.66 0.10 - 0.90 10*3/mm3    Eosinophils, Absolute 0.08 0.00 - 0.40 10*3/mm3    Basophils, Absolute 0.03 0.00 - 0.20 10*3/mm3    Immature Grans, Absolute 0.04 0.00 - 0.05 10*3/mm3    nRBC 0.0 0.0 - 0.2 /100 WBC   ]      Vitals:    10/07/23 0600   BP: 126/81   Pulse: 86   Resp: 16   Temp: 98.2 °F (36.8 °C)   SpO2:            ASSESSMENT/PLAN:    Patient is a 28 y.o.female who is POD# 2 s/p RLTCS  - Progressing as expected  - Vital signs per protocol  - Rh+ /Rubella immune  - General Diet  - breastfeed  - Continue pain regimen for pain control  - AM labs reviewed  - Encourage ambulation  - SCDs while in bed  - Anticipate DC today        Goldie Corley,

## 2023-10-14 ENCOUNTER — TELEPHONE (OUTPATIENT)
Dept: LACTATION | Facility: HOSPITAL | Age: 28
End: 2023-10-14
Payer: COMMERCIAL

## 2023-10-14 NOTE — TELEPHONE ENCOUNTER
Pt states breastfeeding is going much better. They had babies tongue clipped yesterday and latching is much better. Baby's wt is above birth wt already. Pt is pumping some and gets about 2oz, she only pumps to comfort as she had an over supply with last baby. She has no questions or concerns at this time; she was encouraged to reach out to LC as needed.

## 2023-11-01 ENCOUNTER — POSTPARTUM VISIT (OUTPATIENT)
Dept: OBSTETRICS AND GYNECOLOGY | Facility: CLINIC | Age: 28
End: 2023-11-01
Payer: COMMERCIAL

## 2023-11-01 VITALS
DIASTOLIC BLOOD PRESSURE: 78 MMHG | HEART RATE: 87 BPM | BODY MASS INDEX: 30.36 KG/M2 | SYSTOLIC BLOOD PRESSURE: 113 MMHG | WEIGHT: 166 LBS

## 2023-11-01 DIAGNOSIS — K43.9 VENTRAL HERNIA WITHOUT OBSTRUCTION OR GANGRENE: ICD-10-CM

## 2023-11-01 PROBLEM — O34.219 PREVIOUS CESAREAN DELIVERY AFFECTING PREGNANCY: Status: RESOLVED | Noted: 2023-04-20 | Resolved: 2023-11-01

## 2023-11-01 PROBLEM — O26.849 FETAL SIZE INCONSISTENT WITH DATES: Status: RESOLVED | Noted: 2023-08-22 | Resolved: 2023-11-01

## 2023-11-01 NOTE — PROGRESS NOTES
POSTPARTUM Follow Up Visit    CC:  Postpartum     HPI:    The patient is concerned about a bulge above the umbilicus when she stands.  She has no pain with this bulge.  She has no nausea or vomiting.  She has normal bowel movements.  She reports the bulge goes away when she lays down and the belly is soft.    Antepartum or Postpartum complications: Prior   Delivery type:   LTCS  Perineum : NA  Feeding: Breast     Pain:  No  Vaginal Bleeding:  spotting  EPDS score: 5  Plans for BC:  Condoms  Last PAP:   Last Completed Pap Smear       This patient has no relevant Health Maintenance data.            /78   Pulse 87   Wt 75.3 kg (166 lb)   LMP 2023 (Exact Date)   Breastfeeding Yes   BMI 30.36 kg/m²     Physical Exam  Vitals and nursing note reviewed.   Constitutional:       Appearance: Normal appearance. She is not ill-appearing.   Abdominal:      General: Abdomen is flat. There is no distension.      Palpations: Abdomen is soft.      Tenderness: There is no abdominal tenderness. There is no guarding or rebound.      Hernia: A hernia is present.      Comments: When the patient is in a supine position the abdomen is soft nondistended nontender there is no bulge noted.  The incision is clean, dry, intact and well-healing.  With the patient in a standing position there is a large bulge that is both visible and palpable at the umbilicus and extending about 6 to 7 cm superiorly and occupying the majority the center of the abdomen.  This is nontender with palpation.  With palpation it does feel like this could be small bowel bulging outward.   Musculoskeletal:      Right lower leg: No edema.      Left lower leg: No edema.   Neurological:      Mental Status: She is alert and oriented to person, place, and time.           ASSESSMENT AND PLAN:  Diagnoses and all orders for this visit:    1. Encounter for postpartum visit (Primary)  Assessment & Plan:  Stable postoperative course  Continue daily  prenatal vitamin  May gradually start to increase activities.  Avoid intercourse until 6 weeks postpartum      2.  delivery delivered    3. Ventral hernia without obstruction or gangrene  Assessment & Plan:  I suspect the patient has a ventral hernia based on her symptoms and exam findings.  This appears to be a large hernia.  Check CT of the abdomen pelvis to confirm these findings.  The patient may also need to see a general surgeon pending on the outcome of the CT.  At this point I recommended avoiding abdominal exercises and significant amounts of squatting to avoid increased intra-abdominal pressure.  We reviewed signs of ventral hernia strangulation and obstruction of the bowel.  We have discussed that the signs would all be reasons to go to the hospital ASAP for evaluation    Orders:  -     Cancel: CT Abdomen Pelvis With & Without Contrast; Future  -     CT Abdomen Pelvis With & Without Contrast; Future        Counseling:  May resume intercourse  Core strengthening exercises reviewed and recommended  Ok to return to work/school    Follow Up:  Return if symptoms worsen or fail to improve.    Tyson Vidal MD  2023

## 2023-11-01 NOTE — ASSESSMENT & PLAN NOTE
I suspect the patient has a ventral hernia based on her symptoms and exam findings.  This appears to be a large hernia.  Check CT of the abdomen pelvis to confirm these findings.  The patient may also need to see a general surgeon pending on the outcome of the CT.  At this point I recommended avoiding abdominal exercises and significant amounts of squatting to avoid increased intra-abdominal pressure.  We reviewed signs of ventral hernia strangulation and obstruction of the bowel.  We have discussed that the signs would all be reasons to go to the hospital ASAP for evaluation

## 2023-11-01 NOTE — ASSESSMENT & PLAN NOTE
Stable postoperative course  Continue daily prenatal vitamin  May gradually start to increase activities.  Avoid intercourse until 6 weeks postpartum   Benzoyl Peroxide Pregnancy And Lactation Text: This medication is Pregnancy Category C. It is unknown if benzoyl peroxide is excreted in breast milk.

## 2023-11-22 ENCOUNTER — HOSPITAL ENCOUNTER (OUTPATIENT)
Dept: CT IMAGING | Facility: HOSPITAL | Age: 28
Discharge: HOME | End: 2023-11-22
Admitting: OBSTETRICS & GYNECOLOGY
Payer: COMMERCIAL

## 2023-11-22 DIAGNOSIS — K43.9 VENTRAL HERNIA WITHOUT OBSTRUCTION OR GANGRENE: ICD-10-CM

## 2023-11-22 PROCEDURE — 25510000001 IOPAMIDOL PER 1 ML: Performed by: OBSTETRICS & GYNECOLOGY

## 2023-11-22 PROCEDURE — 74178 CT ABD&PLV WO CNTR FLWD CNTR: CPT

## 2023-11-22 PROCEDURE — 74177 CT ABD & PELVIS W/CONTRAST: CPT

## 2023-11-22 RX ADMIN — IOPAMIDOL 100 ML: 755 INJECTION, SOLUTION INTRAVENOUS at 08:22

## 2024-05-03 NOTE — E-VISIT ESCALATED
Patient escalated   Provider Rubi Adan chose to escalate patient to another level of care because: No medications available due to contraindications   Patient was sent the following message:   Based on the information you've provided us, you need to take another step to get care. The treatment for yeast infections in pregnant patients are the 7 day treatment creams (miconazole or clotrimazole) that are over the counter. You can try to call your OBGYN to discuss alternative options, but at this time the only thing we would prescribe would be over the counter medications.   What to do now:   Schedule online   Schedule your visit online   Please click to schedule an appointment     You won't be charged for your eVisit. If you paid with a credit card, the charge will be reversed.   Chief Complaint: Yeast infection   Patient introduction   Patient is 27-year-old female presenting with 1 to 3 days of vulvar pruritus, vulvar burning, and change in vaginal discharge.   Patient-submitted comments   Patient writes: Normal yeast infection symptoms. Itching and redness has subsided some from using vaginal anti-itch cream. .   General presentation   Describes vaginal discharge as thin and yellow.   Vaginal symptoms include genital erythema.   Sexually active in the past 90 days. No new sex partner in previous 2 weeks. Did not receive STI treatment within the past 3 months.   No hormonal medication. Has not recently used douching products or products containing nonoxynol-9.   No new or recent use of possible irritants. Did not take antibiotics in the last 2 weeks.   Positive history of vulvovaginal candidiasis with 4 or more episodes in the previous 12 months. Current symptoms are similar to previous episodes of vulvovaginal candidiasis.   Has tried an OTC topical yeast infection treatment for current symptoms.   No history of treatment for bacterial vaginosis.   Review of red flags/alarm symptoms:    No frothy or  foamy vaginal discharge    No green vaginal discharge    No symptoms suggesting cellulitis    No genital trauma    No genital sores    No abdominal or pelvic surgery within the last month    No fever    No vomiting    No abdominal pain    No retained foreign object in vagina    No treatment for an STI in the last 3 months    No sexual partner tested positive for an STI   Pregnancy/menstrual status/breastfeeding: Patient is pregnant. Not breastfeeding.   Preferred medication route:   Prefers oral treatment option.   Current medications   Not taking other medications or supplements.   Medication allergies   None.   Medication contraindication review    Fluconazole, hydroxyzine pamoate, metronidazole vaginal gel, and tinidazole not prescribed because of patient's pregnancy/menstrual status.   Past medical history   No diabetes mellitus.   Immune conditions: No immunocompromising conditions. No history of cancer.   Social history   Never smoked tobacco.   Assessment:   Patient determined to need a level of care not appropriate to be delivered through eVisit.   Plan:   Patient informed of need to seek in-person care   ----------   Electronically signed by ROSALVA Richmond on 2023-02-02 at 13:01PM   ----------   Patient Interview Transcript:   Which of these symptoms are bothering you? Select all that apply.    Itching around my vagina    Burning around my vagina    Vaginal discharge that looks different than usual   Not selected:    Itching in my vagina    Burning in my vagina    Fishy-smelling vaginal discharge    Pain during sex    Burning with urination    None of the above   How long have you had these symptoms? Select one.    1 to 3 days   Not selected:    Less than 24 hours    4 to 7 days    More than 7 days   How would you describe your vaginal discharge? Select one.    Thin   Not selected:    Thick and clumpy, like cottage cheese    Frothy or foamy    None of the above   What color is your vaginal  discharge? Select one.    Yellow   Not selected:    White    Gray or off-white    Green    None of the above   Do you have any of these vaginal symptoms? Select all that apply.    Redness   Not selected:    Dryness    Unexpected bleeding or spotting between periods or after menopause    Swelling    Darkening of the skin    Pain    None of the above   Do any of these statements apply to the redness or swelling around your vagina? Select all that apply.    None of these   Not selected:    The area is spreading and becoming larger    The area feels unusually warm to the touch    The area feels firm to the touch    There are also bumps that are draining pus   Since your symptoms started, have you used a vaginal health screening kit to check the acidity (pH) of your vaginal discharge? These kits are available without a prescription at many drug stores and pharmacies. Common brands include Monistat Complete Care Vaginal Health Test and Vagisil Screening Kit. Select one.    No   Not selected:    Yes   Have you noticed any sores on your genital area? This includes blisters or ulcers. Select one.    No   Not selected:    Yes   Along with your vaginal symptoms, have you had any of these symptoms? Select all that apply.    None of the above   Not selected:    Fever    Vomiting    Abdominal (stomach) pain   Before your symptoms began, did you have an injury to your genital area or vagina? Select one.    No   Not selected:    Yes   Could an object like a tampon or condom be stuck inside your vagina? Select one.    No   Not selected:    Yes   In the 2 weeks before your symptoms began, did you use either of these products? Select all that apply.    None of the above   Not selected:    Douching products    Products containing nonoxynol-9, a spermicide found in condoms, sponges, vaginal films, and jellies   In the week before your symptoms started, did any of these come into contact with your genital area? Select all that apply.     None of the above   Not selected:    New soap    Underwear washed with a new laundry detergent    New sex toy    New cream or lotion    New medication    New perfume    New feminine or flushable wipe    Other new product (specify)   Have you had a yeast infection before? Select one.    Yes, and my current symptoms feel the same as before   Not selected:    Yes, but my current symptoms feel different than before    No   How many yeast infections have you had in the last 12 months? Select one.    4 or more   Not selected:    0    1 to 3   Have you ever been treated for bacterial vaginosis (BV)? Select one.    No   Not selected:    Yes   In the past, have you developed yeast infections as a result of taking oral antibiotics? Select one.    No   Not selected:    Yes   Were you sexually active at any time in the last 3 months? Select one.    Yes   Not selected:    No   Have you had sex with a new partner in the last 2 weeks? Select one.    No   Not selected:    Yes   Have you had sex with 3 or more partners in the last 3 months? Select one.    No   Not selected:    Yes   In the last 3 months, were you treated for a sexually transmitted infection (STI)? Examples of STIs include chlamydia, gonorrhea, trichomoniasis (trich), herpes, or syphilis. Select one.    No   Not selected:    Yes   Has your sexual partner(s) recently tested positive for a sexually transmitted infection (STI)? Select all that apply.    No, not that I know of   Not selected:    Yes, chlamydia    Yes, gonorrhea    Yes, trichomoniasis (trich)    Other (specify)   Are you pregnant? Select one.    Yes   Not selected:    No   Are you breastfeeding? Select one.    No   Not selected:    Yes   Have you recently had abdominal or pelvic surgery? Select one.    No   Not selected:    Yes, within the last month    Yes, within the last 3 months   Are you currently being treated for Type 1 or Type 2 diabetes? Select one.    No   Not selected:    Yes   Do you have any  "of these conditions that can affect the immune system? Scroll to see all options. Select all that apply.    None of these   Not selected:    History of bone marrow transplant    Chronic kidney disease    Chronic liver disease (including cirrhosis)    HIV/AIDS    Inflammatory bowel disease (Crohn's disease or ulcerative colitis)    Lupus    Moderate to severe plaque psoriasis    Multiple sclerosis    Rheumatoid arthritis    Sickle cell anemia    Alpha or beta thalassemia    History of solid organ transplant (kidney, liver, or heart)    History of spleen removal    An autoimmune disorder not listed here    A condition requiring treatment with long-term use of oral steroids (such as prednisone, prednisolone, or dexamethasone)   Have you ever been diagnosed with cancer? Select one.    No   Not selected:    Yes, I have cancer now    Yes, but I'm in remission   Have you been treated for antibiotic-associated colitis in the last month? This is also called \"C. diff colitis.\" It's commonly caused by the bacteria Clostridium difficile. Select one.    No   Not selected:    Yes   Have you ever been diagnosed with the heart condition known as prolonged QT interval or QT prolongation? Select one.    No   Not selected:    Yes   Do you smoke tobacco? Select one.    No, never   Not selected:    Yes, every day    Yes, some days    No, I quit   Have you tried any of these over-the-counter treatments for your current symptoms? Select all that apply.    Vaginal cream, ointment, or suppository for yeast infection   Not selected:    Anti-itch cream or ointment containing hydrocortisone    Vaginal wash    Vaginal wipes, such as Summer's Evonne    Homeopathic treatment    Other (specify)    I haven't tried anything for my symptoms   Do you take or use any of these medications containing estrogen or progesterone? Select one.    None of the above   Not selected:    Birth control pills    Hormonal intrauterine device (IUD)    Contraceptive " patch    Vaginal ring, such as NuvaRing    Birth control shot, such as Depo-Provera    Hormone replacement therapy (HRT), such as oral and topical medication, vaginal ring, and transdermal patch   In the last 2 weeks, have you taken oral antibiotics? Oral antibiotics are medications that you take by mouth to treat a bacterial infection. Select one.    No   Not selected:    Yes   Are you taking any other medications, vitamins, or supplements? Select one.    No   Not selected:    Yes   Have you ever had an allergic or bad reaction to any medication? Select one.    No   Not selected:    Yes   Have you used the medication disulfiram (Antabuse) in the last 2 weeks? Select one.    No   Not selected:    Yes   If medication is needed, would you prefer oral or vaginal medication? - Oral medications are pills that you swallow. - Vaginal medications are gels, creams, ointments, or suppositories that are placed in the vagina. We'll try to provide medication in the form you prefer, but that's not always possible. Select one.    Oral   Not selected:    Vaginal    No preference   Is there anything else you'd like to tell us about your symptoms?    Normal yeast infection symptoms. Itching and redness has subsided some from using vaginal anti-itch cream.   ----------   Medical history   Medical history data does not currently exist for this patient.     No heavy lifting/straining

## 2024-07-26 NOTE — PROGRESS NOTES
"GYN Problem/Follow Up Visit    Chief Complaint   Patient presents with    Follow-up     Disc fatigue            HPI  April Ashley Hanson is a 29 y.o. female, , who presents for concerns for anemia, 3 weeks ago experienced dizziness, headaches, and tiredness lasting 1 week. The dizziness has resolved. Feeling normal now. Took iron supplement for 2-3 days.     Menses monthly, lasting 5 days, on heavy days, changes super tampon every 2-3 hours, no menstrual pain    Lactating    Additional OB/GYN History   Patient's last menstrual period was 2024 (approximate).  Current contraception: contraceptive methods: Condoms    Past Medical History:   Diagnosis Date    Abnormal Pap smear of cervix     HPV (human papilloma virus) infection     Ovarian cyst     Polycystic ovary syndrome       Past Surgical History:   Procedure Laterality Date     SECTION       SECTION N/A 10/5/2023    Procedure:  SECTION REPEAT;  Surgeon: Tyson Vidal MD;  Location: [facility] LABOR DELIVERY;  Service: Gynecology;  Laterality: N/A;    TONSILLECTOMY        Family History   Problem Relation Age of Onset    Breast cancer Maternal Aunt         age unknown, great aunt    Uterine cancer Maternal Great-Grandmother     Colon cancer Neg Hx     Deep vein thrombosis Neg Hx     Pulmonary embolism Neg Hx      Allergies as of 2024 - Reviewed 2024   Allergen Reaction Noted    Nickel Rash 2013      The additional following portions of the patient's history were reviewed and updated as appropriate: allergies, current medications, past family history, past medical history, past social history, past surgical history, and problem list.    Review of Systems    See HPI for pertinent ROS    Objective   /86   Pulse 77   Ht 157.5 cm (62\")   Wt 75.8 kg (167 lb)   LMP 2024 (Approximate)   Breastfeeding Yes   BMI 30.54 kg/m²     Physical Exam  Vitals and nursing note reviewed.   Constitutional:       " Appearance: Normal appearance. She is well-developed and well-groomed.   Cardiovascular:      Rate and Rhythm: Normal rate.   Pulmonary:      Effort: Pulmonary effort is normal.   Skin:     General: Skin is warm and dry.   Neurological:      Mental Status: She is alert and oriented to person, place, and time.   Psychiatric:         Mood and Affect: Affect normal.         Cognition and Memory: Cognition normal.            Assessment and Plan    Diagnoses and all orders for this visit:    1. Screening for iron deficiency anemia (Primary)  -     Cancel: Comprehensive Metabolic Panel; Future  -     CBC (No Diff)  -     Comprehensive Metabolic Panel  -     Iron Profile    2. Other fatigue  -     Cancel: Comprehensive Metabolic Panel; Future  -     TSH  -     Comprehensive Metabolic Panel  -     Iron Profile  -     hCG, Quantitative, Pregnancy  -     POC Pregnancy, Urine      HCG, Urine, QL   Date Value Ref Range Status   08/08/2024 Negative Negative Final       Counseling:  Symptoms have resolved, she desires anemia/thyroid screening. I recommend balanced diet, plenty of fluids, follow up if symptoms persist or worsen.       Follow Up:  Return if symptoms worsen or fail to improve.        Balbina Jones, APRN  08/08/2024

## 2024-08-08 ENCOUNTER — OFFICE VISIT (OUTPATIENT)
Dept: OBSTETRICS AND GYNECOLOGY | Facility: CLINIC | Age: 29
End: 2024-08-08
Payer: COMMERCIAL

## 2024-08-08 VITALS
HEIGHT: 62 IN | BODY MASS INDEX: 30.73 KG/M2 | WEIGHT: 167 LBS | HEART RATE: 77 BPM | DIASTOLIC BLOOD PRESSURE: 86 MMHG | SYSTOLIC BLOOD PRESSURE: 132 MMHG

## 2024-08-08 DIAGNOSIS — Z13.0 SCREENING FOR IRON DEFICIENCY ANEMIA: Primary | ICD-10-CM

## 2024-08-08 DIAGNOSIS — R53.83 OTHER FATIGUE: ICD-10-CM

## 2024-08-08 LAB
ALBUMIN SERPL-MCNC: 4.5 G/DL (ref 3.5–5.2)
ALBUMIN/GLOB SERPL: 2 G/DL
ALP SERPL-CCNC: 86 U/L (ref 39–117)
ALT SERPL W P-5'-P-CCNC: 21 U/L (ref 1–33)
ANION GAP SERPL CALCULATED.3IONS-SCNC: 11.5 MMOL/L (ref 5–15)
AST SERPL-CCNC: 20 U/L (ref 1–32)
B-HCG UR QL: NEGATIVE
BILIRUB SERPL-MCNC: 0.7 MG/DL (ref 0–1.2)
BUN SERPL-MCNC: 14 MG/DL (ref 6–20)
BUN/CREAT SERPL: 14.7 (ref 7–25)
CALCIUM SPEC-SCNC: 9.9 MG/DL (ref 8.6–10.5)
CHLORIDE SERPL-SCNC: 103 MMOL/L (ref 98–107)
CO2 SERPL-SCNC: 21.5 MMOL/L (ref 22–29)
CREAT SERPL-MCNC: 0.95 MG/DL (ref 0.57–1)
DEPRECATED RDW RBC AUTO: 42.2 FL (ref 37–54)
EGFRCR SERPLBLD CKD-EPI 2021: 83.9 ML/MIN/1.73
ERYTHROCYTE [DISTWIDTH] IN BLOOD BY AUTOMATED COUNT: 13.5 % (ref 12.3–15.4)
EXPIRATION DATE: NORMAL
GLOBULIN UR ELPH-MCNC: 2.2 GM/DL
GLUCOSE SERPL-MCNC: 92 MG/DL (ref 65–99)
HCG INTACT+B SERPL-ACNC: <0.5 MIU/ML
HCT VFR BLD AUTO: 41.8 % (ref 34–46.6)
HGB BLD-MCNC: 13.3 G/DL (ref 12–15.9)
INTERNAL NEGATIVE CONTROL: NORMAL
INTERNAL POSITIVE CONTROL: NORMAL
IRON 24H UR-MRATE: 49 MCG/DL (ref 37–145)
IRON SATN MFR SERPL: 12 % (ref 20–50)
Lab: NORMAL
MCH RBC QN AUTO: 27.3 PG (ref 26.6–33)
MCHC RBC AUTO-ENTMCNC: 31.8 G/DL (ref 31.5–35.7)
MCV RBC AUTO: 85.8 FL (ref 79–97)
PLATELET # BLD AUTO: 226 10*3/MM3 (ref 140–450)
PMV BLD AUTO: 11.7 FL (ref 6–12)
POTASSIUM SERPL-SCNC: 4 MMOL/L (ref 3.5–5.2)
PROT SERPL-MCNC: 6.7 G/DL (ref 6–8.5)
RBC # BLD AUTO: 4.87 10*6/MM3 (ref 3.77–5.28)
SODIUM SERPL-SCNC: 136 MMOL/L (ref 136–145)
TIBC SERPL-MCNC: 407 MCG/DL (ref 298–536)
TRANSFERRIN SERPL-MCNC: 273 MG/DL (ref 200–360)
TSH SERPL DL<=0.05 MIU/L-ACNC: 1.18 UIU/ML (ref 0.27–4.2)
WBC NRBC COR # BLD AUTO: 9.38 10*3/MM3 (ref 3.4–10.8)

## 2024-08-08 PROCEDURE — 84702 CHORIONIC GONADOTROPIN TEST: CPT | Performed by: NURSE PRACTITIONER

## 2024-08-08 PROCEDURE — 84466 ASSAY OF TRANSFERRIN: CPT | Performed by: NURSE PRACTITIONER

## 2024-08-08 PROCEDURE — 83540 ASSAY OF IRON: CPT | Performed by: NURSE PRACTITIONER

## 2024-08-08 PROCEDURE — 80050 GENERAL HEALTH PANEL: CPT | Performed by: NURSE PRACTITIONER

## 2025-02-25 ENCOUNTER — OFFICE VISIT (OUTPATIENT)
Age: 30
End: 2025-02-25
Payer: COMMERCIAL

## 2025-02-25 VITALS
HEIGHT: 62 IN | SYSTOLIC BLOOD PRESSURE: 135 MMHG | OXYGEN SATURATION: 96 % | BODY MASS INDEX: 31.47 KG/M2 | HEART RATE: 82 BPM | WEIGHT: 171 LBS | DIASTOLIC BLOOD PRESSURE: 87 MMHG

## 2025-02-25 DIAGNOSIS — Z00.00 WELL ADULT EXAM: Primary | ICD-10-CM

## 2025-02-25 DIAGNOSIS — J30.1 NON-SEASONAL ALLERGIC RHINITIS DUE TO POLLEN: ICD-10-CM

## 2025-02-25 DIAGNOSIS — D50.9 IRON DEFICIENCY ANEMIA, UNSPECIFIED IRON DEFICIENCY ANEMIA TYPE: ICD-10-CM

## 2025-02-25 DIAGNOSIS — R10.13 DYSPEPSIA: ICD-10-CM

## 2025-02-25 PROBLEM — M62.08 DIASTASIS RECTI: Status: ACTIVE | Noted: 2025-02-25

## 2025-02-25 PROBLEM — K43.9 VENTRAL HERNIA WITHOUT OBSTRUCTION OR GANGRENE: Status: RESOLVED | Noted: 2023-11-01 | Resolved: 2025-02-25

## 2025-02-25 PROCEDURE — 99395 PREV VISIT EST AGE 18-39: CPT | Performed by: INTERNAL MEDICINE

## 2025-02-25 NOTE — PROGRESS NOTES
"Chief Complaint  Establish Care (New pt ), Nasal Congestion, and Mass (In throat /Pt states its been there for about 3 weeks now )    Subjective      April Ashley Hanson presents to [facility]    History of Present Illness  Patient is a healthy 30-year-old female being seen as a New Patient in 2/25.  She is on no chronic meds, she did have labs from 8/24 that were reviewed, otherwise we will address her acute concerns, any care gaps, and make arrangements for follow-up.    Review of Systems   Constitutional:  Negative for appetite change, fatigue and fever.   HENT:  Negative for congestion and ear pain.    Eyes:  Negative for blurred vision.   Respiratory:  Negative for cough, chest tightness, shortness of breath and wheezing.    Cardiovascular:  Negative for chest pain, palpitations and leg swelling.   Gastrointestinal:  Negative for abdominal pain.   Genitourinary:  Negative for difficulty urinating, dysuria and hematuria.   Musculoskeletal:  Negative for arthralgias and gait problem.   Skin:  Negative for skin lesions.   Neurological:  Negative for syncope, memory problem and confusion.   Psychiatric/Behavioral:  Negative for self-injury and depressed mood.        Objective   Vital Signs:   /87   Pulse 82   Ht 157.5 cm (62\")   Wt 77.6 kg (171 lb)   SpO2 96%   BMI 31.28 kg/m²         Physical Exam  Vitals and nursing note reviewed.   Constitutional:       General: She is not in acute distress.     Appearance: Normal appearance. She is not toxic-appearing.   HENT:      Head: Atraumatic.      Right Ear: External ear normal.      Left Ear: External ear normal.      Nose: Nose normal.      Mouth/Throat:      Mouth: Mucous membranes are moist.   Eyes:      General:         Right eye: No discharge.         Left eye: No discharge.      Extraocular Movements: Extraocular movements intact.      Pupils: Pupils are equal, round, and reactive to light.   Neck:      Comments: No " carotid bruits.  Cardiovascular:      Rate and Rhythm: Normal rate and regular rhythm.      Pulses: Normal pulses.      Heart sounds: Normal heart sounds. No murmur heard.     No gallop.      Comments: Heart tones normal, no ectopy.  Pulmonary:      Effort: Pulmonary effort is normal. No respiratory distress.      Breath sounds: No wheezing, rhonchi or rales.      Comments: Lung fields clear bilaterally.  Abdominal:      General: There is no distension.      Palpations: Abdomen is soft. There is no mass.      Tenderness: There is no abdominal tenderness. There is no guarding.   Musculoskeletal:         General: No swelling or tenderness.      Cervical back: No tenderness.      Right lower leg: No edema.      Left lower leg: No edema.      Comments: No peripheral edema.   Skin:     General: Skin is warm and dry.      Findings: No rash.   Neurological:      General: No focal deficit present.      Mental Status: She is alert and oriented to person, place, and time. Mental status is at baseline.      Motor: No weakness.      Gait: Gait normal.   Psychiatric:         Mood and Affect: Mood normal.         Thought Content: Thought content normal.          Result Review   The following data was reviewed by: Freddie Chaudhary MD on 02/25/2025:  [x] Laboratory  [] Microbiology  [x] Radiology  [] EKG/telemetry  [] Cardiology/Vascular  [] Pathology  [x] Old records             Assessment and Plan   Diagnoses and all orders for this visit:    1. Well adult exam (Primary)  Overview:  Preventive measures: were reviewed with the patient at this office visit.  They included but were not limited to discussions in regards to vaccines outstanding, auto safety with seat belts and other assistive devices, fall prevention, and routine screening studies.    Exercise: No ischemic sxs with w/o's 3-4 x/wk.  Comprehensive labs: Reviewed all from 8/24.    Covid vaccine: Patient deferred.  Other vaccines: Typically does okay without flu  "shots.    MMG: Not yet indicated (maternal great aunts +CA)  Colon: Not yet indicated.    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 Iron deficiency anemia, unspecified iron deficiency anemia type  -     CBC & Differential; Future  -     Ferritin; Future  -     Iron Profile; Future    3. Non-seasonal allergic rhinitis due to pollen  Assessment & Plan:  This is been an issue particularly for the past 6 months, she uses medications intermittently over the counter.    She is not have any associated asthma.  Does have periodic reflux.    Discussed with patient best initial course of action would be for her to get on nasal steroid, Flonase or Nasonex etc. over-the-counter, and utilizes regularly.  If she is not having improvement in a month she will reach back out to our office.      4. Dyspepsia  Assessment & Plan:  Difficult to tell if this is also contributing to her \"lump in throat\" sensation.  She does not typically have heartburn, just uses famotidine as needed.    Since her symptoms are becoming more frequent, at least in regards to the lump in the throat, recommend she get on daily low-dose H2 blocker initially, specifically 20 mg of famotidine.  Patient will reach back out to us if not improving within a month.          BMI is >= 30 and <35. (Class 1 Obesity). The following options were offered after discussion;: exercise counseling/recommendations and nutrition counseling/recommendations            Follow Up   Return in about 3 months (around 5/25/2025).  Patient was given instructions and counseling regarding her condition or for health " maintenance advice. Please see specific information pulled into the AVS if appropriate.     Total Time Spent:   minutes     This time includes time spent by me in the following activities: preparing for the visit, reviewing extensive past medical history and tests, performing a medically appropriate examination and/or evaluation, counseling and educating the patient and/or caregivers, ordering medications, tests, or procedures, referring and/or communicating with other health care professionals and documenting information in the medical record all on this date of service.

## 2025-02-25 NOTE — ASSESSMENT & PLAN NOTE
"Difficult to tell if this is also contributing to her \"lump in throat\" sensation.  She does not typically have heartburn, just uses famotidine as needed.    Since her symptoms are becoming more frequent, at least in regards to the lump in the throat, recommend she get on daily low-dose H2 blocker initially, specifically 20 mg of famotidine.  Patient will reach back out to us if not improving within a month.  "

## 2025-02-25 NOTE — ASSESSMENT & PLAN NOTE
This is been an issue particularly for the past 6 months, she uses medications intermittently over the counter.    She is not have any associated asthma.  Does have periodic reflux.    Discussed with patient best initial course of action would be for her to get on nasal steroid, Flonase or Nasonex etc. over-the-counter, and utilizes regularly.  If she is not having improvement in a month she will reach back out to our office.

## 2025-02-27 ENCOUNTER — PATIENT ROUNDING (BHMG ONLY) (OUTPATIENT)
Age: 30
End: 2025-02-27
Payer: COMMERCIAL

## 2025-02-27 NOTE — PROGRESS NOTES
A My-Chart message has been sent to the patient for PATIENT ROUNDING with [facility].

## 2025-03-17 ENCOUNTER — LAB (OUTPATIENT)
Dept: LAB | Facility: HOSPITAL | Age: 30
End: 2025-03-17
Payer: COMMERCIAL

## 2025-03-17 DIAGNOSIS — D50.9 IRON DEFICIENCY ANEMIA, UNSPECIFIED IRON DEFICIENCY ANEMIA TYPE: ICD-10-CM

## 2025-03-17 DIAGNOSIS — Z00.00 WELL ADULT EXAM: ICD-10-CM

## 2025-03-17 LAB
25(OH)D3 SERPL-MCNC: 27.7 NG/ML (ref 30–100)
ALBUMIN SERPL-MCNC: 4.6 G/DL (ref 3.5–5.2)
ALBUMIN/GLOB SERPL: 1.9 G/DL
ALP SERPL-CCNC: 80 U/L (ref 39–117)
ALT SERPL W P-5'-P-CCNC: 19 U/L (ref 1–33)
ANION GAP SERPL CALCULATED.3IONS-SCNC: 12.1 MMOL/L (ref 5–15)
AST SERPL-CCNC: 19 U/L (ref 1–32)
BASOPHILS # BLD AUTO: 0.03 10*3/MM3 (ref 0–0.2)
BASOPHILS NFR BLD AUTO: 0.5 % (ref 0–1.5)
BILIRUB SERPL-MCNC: 0.9 MG/DL (ref 0–1.2)
BILIRUB UR QL STRIP: NEGATIVE
BUN SERPL-MCNC: 11 MG/DL (ref 6–20)
BUN/CREAT SERPL: 15.9 (ref 7–25)
CALCIUM SPEC-SCNC: 9.6 MG/DL (ref 8.6–10.5)
CHLORIDE SERPL-SCNC: 101 MMOL/L (ref 98–107)
CHOLEST SERPL-MCNC: 159 MG/DL (ref 0–200)
CLARITY UR: CLEAR
CO2 SERPL-SCNC: 25.9 MMOL/L (ref 22–29)
COLOR UR: YELLOW
CREAT SERPL-MCNC: 0.69 MG/DL (ref 0.57–1)
DEPRECATED RDW RBC AUTO: 39.4 FL (ref 37–54)
EGFRCR SERPLBLD CKD-EPI 2021: 120.7 ML/MIN/1.73
EOSINOPHIL # BLD AUTO: 0.1 10*3/MM3 (ref 0–0.4)
EOSINOPHIL NFR BLD AUTO: 1.7 % (ref 0.3–6.2)
ERYTHROCYTE [DISTWIDTH] IN BLOOD BY AUTOMATED COUNT: 12.7 % (ref 12.3–15.4)
FERRITIN SERPL-MCNC: 15.7 NG/ML (ref 13–150)
GLOBULIN UR ELPH-MCNC: 2.4 GM/DL
GLUCOSE SERPL-MCNC: 97 MG/DL (ref 65–99)
GLUCOSE UR STRIP-MCNC: NEGATIVE MG/DL
HBA1C MFR BLD: 5.4 % (ref 4.8–5.6)
HCT VFR BLD AUTO: 41.1 % (ref 34–46.6)
HDLC SERPL-MCNC: 55 MG/DL (ref 40–60)
HGB BLD-MCNC: 13.4 G/DL (ref 12–15.9)
HGB UR QL STRIP.AUTO: NEGATIVE
HOLD SPECIMEN: NORMAL
IMM GRANULOCYTES # BLD AUTO: 0.01 10*3/MM3 (ref 0–0.05)
IMM GRANULOCYTES NFR BLD AUTO: 0.2 % (ref 0–0.5)
IRON 24H UR-MRATE: 54 MCG/DL (ref 37–145)
IRON SATN MFR SERPL: 13 % (ref 20–50)
KETONES UR QL STRIP: NEGATIVE
LDLC SERPL CALC-MCNC: 95 MG/DL (ref 0–100)
LDLC/HDLC SERPL: 1.75 {RATIO}
LEUKOCYTE ESTERASE UR QL STRIP.AUTO: NEGATIVE
LYMPHOCYTES # BLD AUTO: 2.03 10*3/MM3 (ref 0.7–3.1)
LYMPHOCYTES NFR BLD AUTO: 34.1 % (ref 19.6–45.3)
MCH RBC QN AUTO: 27.7 PG (ref 26.6–33)
MCHC RBC AUTO-ENTMCNC: 32.6 G/DL (ref 31.5–35.7)
MCV RBC AUTO: 84.9 FL (ref 79–97)
MONOCYTES # BLD AUTO: 0.41 10*3/MM3 (ref 0.1–0.9)
MONOCYTES NFR BLD AUTO: 6.9 % (ref 5–12)
NEUTROPHILS NFR BLD AUTO: 3.38 10*3/MM3 (ref 1.7–7)
NEUTROPHILS NFR BLD AUTO: 56.6 % (ref 42.7–76)
NITRITE UR QL STRIP: NEGATIVE
NRBC BLD AUTO-RTO: 0 /100 WBC (ref 0–0.2)
PH UR STRIP.AUTO: 7 [PH] (ref 5–8)
PLATELET # BLD AUTO: 255 10*3/MM3 (ref 140–450)
PMV BLD AUTO: 11.4 FL (ref 6–12)
POTASSIUM SERPL-SCNC: 4.4 MMOL/L (ref 3.5–5.2)
PROT SERPL-MCNC: 7 G/DL (ref 6–8.5)
PROT UR QL STRIP: NEGATIVE
RBC # BLD AUTO: 4.84 10*6/MM3 (ref 3.77–5.28)
SODIUM SERPL-SCNC: 139 MMOL/L (ref 136–145)
SP GR UR STRIP: 1.01 (ref 1–1.03)
T4 FREE SERPL-MCNC: 1.2 NG/DL (ref 0.92–1.68)
TIBC SERPL-MCNC: 417 MCG/DL (ref 298–536)
TRANSFERRIN SERPL-MCNC: 280 MG/DL (ref 200–360)
TRIGL SERPL-MCNC: 39 MG/DL (ref 0–150)
TSH SERPL DL<=0.05 MIU/L-ACNC: 1.5 UIU/ML (ref 0.27–4.2)
UROBILINOGEN UR QL STRIP: NORMAL
VLDLC SERPL-MCNC: 9 MG/DL (ref 5–40)
WBC NRBC COR # BLD AUTO: 5.96 10*3/MM3 (ref 3.4–10.8)

## 2025-03-17 PROCEDURE — 80050 GENERAL HEALTH PANEL: CPT

## 2025-03-17 PROCEDURE — 36415 COLL VENOUS BLD VENIPUNCTURE: CPT

## 2025-03-17 PROCEDURE — 84466 ASSAY OF TRANSFERRIN: CPT

## 2025-03-17 PROCEDURE — 82728 ASSAY OF FERRITIN: CPT

## 2025-03-17 PROCEDURE — 83036 HEMOGLOBIN GLYCOSYLATED A1C: CPT

## 2025-03-17 PROCEDURE — 82306 VITAMIN D 25 HYDROXY: CPT

## 2025-03-17 PROCEDURE — 84439 ASSAY OF FREE THYROXINE: CPT

## 2025-03-17 PROCEDURE — 83540 ASSAY OF IRON: CPT

## 2025-03-17 PROCEDURE — 80061 LIPID PANEL: CPT

## 2025-03-17 PROCEDURE — 81003 URINALYSIS AUTO W/O SCOPE: CPT

## 2025-05-29 ENCOUNTER — OFFICE VISIT (OUTPATIENT)
Age: 30
End: 2025-05-29
Payer: COMMERCIAL

## 2025-05-29 VITALS
HEIGHT: 62 IN | HEART RATE: 81 BPM | SYSTOLIC BLOOD PRESSURE: 130 MMHG | BODY MASS INDEX: 32.94 KG/M2 | DIASTOLIC BLOOD PRESSURE: 80 MMHG | OXYGEN SATURATION: 98 % | WEIGHT: 179 LBS

## 2025-05-29 DIAGNOSIS — R10.13 DYSPEPSIA: Primary | ICD-10-CM

## 2025-05-29 DIAGNOSIS — M62.08 DIASTASIS RECTI: ICD-10-CM

## 2025-05-29 DIAGNOSIS — J30.1 NON-SEASONAL ALLERGIC RHINITIS DUE TO POLLEN: ICD-10-CM

## 2025-05-29 DIAGNOSIS — E55.9 VITAMIN D DEFICIENCY: ICD-10-CM

## 2025-05-29 DIAGNOSIS — Z00.00 WELL ADULT EXAM: ICD-10-CM

## 2025-05-29 NOTE — ASSESSMENT & PLAN NOTE
This is bothering her some as of her 5/25 OV, but she wants to work on this conservatively with diet and exercise initially, she will reach back out to us if she wants to discuss things further with general surgery.

## 2025-05-29 NOTE — PROGRESS NOTES
"Chief Complaint  Follow-up (Pt had labs after last appt. /After her second child she had a CT scan and was told that she had a start of a hernia, she states that it is uncomfortable and makes it hard for her to do daily activity. It is above her belly button. )    Subjective      April Ashley Hanson presents to [facility]    History of Present Illness  Patient is a healthy 30-year-old female seen as a New Patient in 2/25, and who is coming back now 5/25 for initial follow-up.  She is on no chronic meds, she did have labs from 8/24 that were reviewed, otherwise we will address her acute concerns, any care gaps, and make arrangements for follow-up.    Review of Systems   Constitutional:  Negative for appetite change, fatigue and fever.   HENT:  Negative for congestion and ear pain.    Eyes:  Negative for blurred vision.   Respiratory:  Negative for cough, chest tightness, shortness of breath and wheezing.    Cardiovascular:  Negative for chest pain, palpitations and leg swelling.   Gastrointestinal:  Negative for abdominal pain.   Genitourinary:  Negative for difficulty urinating, dysuria and hematuria.   Musculoskeletal:  Negative for arthralgias and gait problem.   Skin:  Negative for skin lesions.   Neurological:  Negative for syncope, memory problem and confusion.   Psychiatric/Behavioral:  Negative for self-injury and depressed mood.        Objective   Vital Signs:   /80   Pulse 81   Ht 157.5 cm (62.01\")   Wt 81.2 kg (179 lb)   SpO2 98%   BMI 32.73 kg/m²         Physical Exam  Vitals and nursing note reviewed.   Constitutional:       General: She is not in acute distress.     Appearance: Normal appearance. She is not toxic-appearing.   HENT:      Head: Atraumatic.      Right Ear: External ear normal.      Left Ear: External ear normal.      Nose: Nose normal.      Mouth/Throat:      Mouth: Mucous membranes are moist.   Eyes:      General:         Right eye: No " discharge.         Left eye: No discharge.      Extraocular Movements: Extraocular movements intact.      Pupils: Pupils are equal, round, and reactive to light.   Neck:      Comments: No carotid bruits.  Cardiovascular:      Rate and Rhythm: Normal rate and regular rhythm.      Pulses: Normal pulses.      Heart sounds: Normal heart sounds. No murmur heard.     No gallop.      Comments: Heart tones normal, no ectopy.  Pulmonary:      Effort: Pulmonary effort is normal. No respiratory distress.      Breath sounds: No wheezing, rhonchi or rales.      Comments: Lung fields clear bilaterally.  Abdominal:      General: There is no distension.      Palpations: Abdomen is soft. There is no mass.      Tenderness: There is no abdominal tenderness. There is no guarding.   Musculoskeletal:         General: No swelling or tenderness.      Cervical back: No tenderness.      Right lower leg: No edema.      Left lower leg: No edema.      Comments: No peripheral edema.   Skin:     General: Skin is warm and dry.      Findings: No rash.   Neurological:      General: No focal deficit present.      Mental Status: She is alert and oriented to person, place, and time. Mental status is at baseline.      Motor: No weakness.      Gait: Gait normal.   Psychiatric:         Mood and Affect: Mood normal.         Thought Content: Thought content normal.          Result Review   The following data was reviewed by: Freddie Chaudhary MD on 02/25/2025:  [x] Laboratory  [] Microbiology  [x] Radiology  [] EKG/telemetry  [] Cardiology/Vascular  [] Pathology  [x] Old records             Assessment and Plan   Diagnoses and all orders for this visit:    1. Dyspepsia (Primary)  Assessment & Plan:  Patient feels this is more related to stress, has not needed to use pepcid.      2. Well adult exam  Overview:  This diagnosis being utilized for follow-up laboratory studies only, physical was performed at her appointment in 2/25.    Exercise: No ischemic sxs with  w/o's 3-4 x/wk.  Comprehensive labs: Reviewed all from 8/24.    Covid vaccine: Patient deferred.  Other vaccines: Typically does okay without flu shots.    MMG: Not yet indicated (maternal great aunts +CA)  Colon: Not yet indicated.    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 Vitamin D deficiency  -     Vitamin D,25-Hydroxy; Future    4. Non-seasonal allergic rhinitis due to pollen  Assessment & Plan:  Patient little better this regards as of her 5/25 follow-up, she is using over-the-counter steroid inhaler on an as-needed basis presently.      5. Diastasis recti  Overview:  CT 11/23:  1. Diastasis rectus abdominus muscle with bulging of bowel and fat through the diastasis.  2. Nonspecific 7 millimeter low-density focus in the spleen.  This is not well characterized and most likely benign lesions such as small hemangioma or lymphangioma.      Assessment & Plan:  This is bothering her some as of her 5/25 OV, but she wants to work on this conservatively with diet and exercise initially, she will reach back out to us if she wants to discuss things further with general surgery.          BMI is >= 30 and <35. (Class 1 Obesity). The following options were offered after discussion;: exercise counseling/recommendations and nutrition counseling/recommendations            Follow Up   Return in about 1 year (around 5/29/2026).  Patient was given instructions and counseling regarding her condition or for health maintenance advice. Please see specific information pulled into the AVS if appropriate.     Total Time Spent:   minutes     This time includes time spent by me in the following  activities: preparing for the visit, reviewing extensive past medical history and tests, performing a medically appropriate examination and/or evaluation, counseling and educating the patient and/or caregivers, ordering medications, tests, or procedures, referring and/or communicating with other health care professionals and documenting information in the medical record all on this date of service.

## 2025-05-29 NOTE — ASSESSMENT & PLAN NOTE
Patient little better this regards as of her 5/25 follow-up, she is using over-the-counter steroid inhaler on an as-needed basis presently.

## (undated) DEVICE — CVR HNDL LT SURG ACCSSRY BLU STRL

## (undated) DEVICE — VIOLET BRAIDED (POLYGLACTIN 910), SYNTHETIC ABSORBABLE SUTURE: Brand: COATED VICRYL

## (undated) DEVICE — GLV SURG BIOGEL LTX PF 7

## (undated) DEVICE — STERILE POLYISOPRENE POWDER-FREE SURGICAL GLOVES WITH EMOLLIENT COATING: Brand: PROTEXIS

## (undated) DEVICE — NEEDLE,18GX1.5",REG,BEVEL: Brand: MEDLINE

## (undated) DEVICE — SUT MNCRYL 4/0 PS2 18 IN

## (undated) DEVICE — SUT MNCRYL 0 CT1 27IN VIL

## (undated) DEVICE — TRY CATH FOL ADVANCE SIL W/BAG 16F

## (undated) DEVICE — INTENDED FOR TISSUE SEPARATION, AND OTHER PROCEDURES THAT REQUIRE A SHARP SURGICAL BLADE TO PUNCTURE OR CUT.: Brand: BARD-PARKER ® CARBON RIB-BACK BLADES

## (undated) DEVICE — PAD GRND REM POLYHESIVE A/ DISP

## (undated) DEVICE — SUT MNCRYL 0/0 CTX 36IN Y398H

## (undated) DEVICE — C SECTION PACK: Brand: MEDLINE INDUSTRIES, INC.

## (undated) DEVICE — SUT CHRM 0 CT1 36IN 924H

## (undated) DEVICE — DEV TRANSF BLD W/LUER ADPT CA/198

## (undated) DEVICE — Device: Brand: PORTEX

## (undated) DEVICE — SUT VIC 3/0 CTI 36IN J944H